# Patient Record
Sex: MALE | Race: OTHER | HISPANIC OR LATINO | URBAN - METROPOLITAN AREA
[De-identification: names, ages, dates, MRNs, and addresses within clinical notes are randomized per-mention and may not be internally consistent; named-entity substitution may affect disease eponyms.]

---

## 2020-05-11 ENCOUNTER — HOSPITAL ENCOUNTER (INPATIENT)
Facility: HOSPITAL | Age: 45
LOS: 2 days | Discharge: HOME | DRG: 378 | End: 2020-05-14
Attending: FAMILY MEDICINE | Admitting: INTERNAL MEDICINE

## 2020-05-11 DIAGNOSIS — D62 ACUTE BLOOD LOSS ANEMIA: ICD-10-CM

## 2020-05-11 DIAGNOSIS — K92.2 GI BLEED: ICD-10-CM

## 2020-05-11 DIAGNOSIS — K25.4 GASTROINTESTINAL HEMORRHAGE ASSOCIATED WITH GASTRIC ULCER: Primary | ICD-10-CM

## 2020-05-11 DIAGNOSIS — R55 SYNCOPE, UNSPECIFIED SYNCOPE TYPE: ICD-10-CM

## 2020-05-11 DIAGNOSIS — K92.2 UPPER GI BLEED: ICD-10-CM

## 2020-05-11 PROBLEM — D64.9 SYMPTOMATIC ANEMIA: Status: ACTIVE | Noted: 2020-05-11

## 2020-05-11 LAB
ABO + RH BLD: NORMAL
ALBUMIN SERPL BCP-MCNC: 2.9 G/DL (ref 3.5–5.2)
ALP SERPL-CCNC: 46 U/L (ref 55–135)
ALT SERPL W/O P-5'-P-CCNC: 15 U/L (ref 10–44)
ANION GAP SERPL CALC-SCNC: 9 MMOL/L (ref 8–16)
AST SERPL-CCNC: 19 U/L (ref 10–40)
BASOPHILS # BLD AUTO: 0.03 K/UL (ref 0–0.2)
BASOPHILS NFR BLD: 0.3 % (ref 0–1.9)
BILIRUB SERPL-MCNC: 0.1 MG/DL (ref 0.1–1)
BILIRUB UR QL STRIP: NEGATIVE
BLD GP AB SCN CELLS X3 SERPL QL: NORMAL
BUN SERPL-MCNC: 30 MG/DL (ref 6–20)
CALCIUM SERPL-MCNC: 8.4 MG/DL (ref 8.7–10.5)
CHLORIDE SERPL-SCNC: 106 MMOL/L (ref 95–110)
CLARITY UR: CLEAR
CO2 SERPL-SCNC: 26 MMOL/L (ref 23–29)
COLOR UR: YELLOW
CREAT SERPL-MCNC: 0.9 MG/DL (ref 0.5–1.4)
DIFFERENTIAL METHOD: ABNORMAL
EOSINOPHIL # BLD AUTO: 0 K/UL (ref 0–0.5)
EOSINOPHIL NFR BLD: 0.3 % (ref 0–8)
ERYTHROCYTE [DISTWIDTH] IN BLOOD BY AUTOMATED COUNT: 12 % (ref 11.5–14.5)
EST. GFR  (AFRICAN AMERICAN): >60 ML/MIN/1.73 M^2
EST. GFR  (NON AFRICAN AMERICAN): >60 ML/MIN/1.73 M^2
GLUCOSE SERPL-MCNC: 107 MG/DL (ref 70–110)
GLUCOSE UR QL STRIP: NEGATIVE
HCT VFR BLD AUTO: 24.1 % (ref 40–54)
HGB BLD-MCNC: 7.9 G/DL (ref 14–18)
HGB UR QL STRIP: NEGATIVE
HIV 1+2 AB+HIV1 P24 AG SERPL QL IA: NEGATIVE
IMM GRANULOCYTES # BLD AUTO: 0.07 K/UL (ref 0–0.04)
IMM GRANULOCYTES NFR BLD AUTO: 0.7 % (ref 0–0.5)
INR PPP: 1.1 (ref 0.8–1.2)
KETONES UR QL STRIP: ABNORMAL
LACTATE SERPL-SCNC: 2.2 MMOL/L (ref 0.5–2.2)
LEUKOCYTE ESTERASE UR QL STRIP: NEGATIVE
LYMPHOCYTES # BLD AUTO: 1.2 K/UL (ref 1–4.8)
LYMPHOCYTES NFR BLD: 12.5 % (ref 18–48)
MCH RBC QN AUTO: 29.5 PG (ref 27–31)
MCHC RBC AUTO-ENTMCNC: 32.8 G/DL (ref 32–36)
MCV RBC AUTO: 90 FL (ref 82–98)
MONOCYTES # BLD AUTO: 0.8 K/UL (ref 0.3–1)
MONOCYTES NFR BLD: 8.2 % (ref 4–15)
NEUTROPHILS # BLD AUTO: 7.4 K/UL (ref 1.8–7.7)
NEUTROPHILS NFR BLD: 78 % (ref 38–73)
NITRITE UR QL STRIP: NEGATIVE
NRBC BLD-RTO: 0 /100 WBC
PH UR STRIP: 7 [PH] (ref 5–8)
PLATELET # BLD AUTO: 226 K/UL (ref 150–350)
PMV BLD AUTO: 10.5 FL (ref 9.2–12.9)
POTASSIUM SERPL-SCNC: 3.8 MMOL/L (ref 3.5–5.1)
PROT SERPL-MCNC: 5.4 G/DL (ref 6–8.4)
PROT UR QL STRIP: NEGATIVE
PROTHROMBIN TIME: 11.3 SEC (ref 9–12.5)
RBC # BLD AUTO: 2.68 M/UL (ref 4.6–6.2)
SODIUM SERPL-SCNC: 141 MMOL/L (ref 136–145)
SP GR UR STRIP: 1.02 (ref 1–1.03)
URN SPEC COLLECT METH UR: ABNORMAL
UROBILINOGEN UR STRIP-ACNC: NEGATIVE EU/DL
WBC # BLD AUTO: 9.44 K/UL (ref 3.9–12.7)

## 2020-05-11 PROCEDURE — 83605 ASSAY OF LACTIC ACID: CPT

## 2020-05-11 PROCEDURE — 87040 BLOOD CULTURE FOR BACTERIA: CPT

## 2020-05-11 PROCEDURE — C9113 INJ PANTOPRAZOLE SODIUM, VIA: HCPCS | Performed by: FAMILY MEDICINE

## 2020-05-11 PROCEDURE — 36430 TRANSFUSION BLD/BLD COMPNT: CPT

## 2020-05-11 PROCEDURE — 85025 COMPLETE CBC W/AUTO DIFF WBC: CPT

## 2020-05-11 PROCEDURE — 36415 COLL VENOUS BLD VENIPUNCTURE: CPT

## 2020-05-11 PROCEDURE — 81003 URINALYSIS AUTO W/O SCOPE: CPT

## 2020-05-11 PROCEDURE — U0002 COVID-19 LAB TEST NON-CDC: HCPCS

## 2020-05-11 PROCEDURE — 96374 THER/PROPH/DIAG INJ IV PUSH: CPT

## 2020-05-11 PROCEDURE — 86703 HIV-1/HIV-2 1 RESULT ANTBDY: CPT

## 2020-05-11 PROCEDURE — 63600175 PHARM REV CODE 636 W HCPCS: Performed by: FAMILY MEDICINE

## 2020-05-11 PROCEDURE — 93010 EKG 12-LEAD: ICD-10-PCS | Mod: ,,, | Performed by: INTERNAL MEDICINE

## 2020-05-11 PROCEDURE — 80053 COMPREHEN METABOLIC PANEL: CPT

## 2020-05-11 PROCEDURE — 96361 HYDRATE IV INFUSION ADD-ON: CPT

## 2020-05-11 PROCEDURE — 99291 CRITICAL CARE FIRST HOUR: CPT

## 2020-05-11 PROCEDURE — 93005 ELECTROCARDIOGRAM TRACING: CPT

## 2020-05-11 PROCEDURE — 85610 PROTHROMBIN TIME: CPT

## 2020-05-11 PROCEDURE — 25000003 PHARM REV CODE 250: Performed by: FAMILY MEDICINE

## 2020-05-11 PROCEDURE — 86920 COMPATIBILITY TEST SPIN: CPT

## 2020-05-11 PROCEDURE — 93010 ELECTROCARDIOGRAM REPORT: CPT | Mod: ,,, | Performed by: INTERNAL MEDICINE

## 2020-05-11 PROCEDURE — P9016 RBC LEUKOCYTES REDUCED: HCPCS

## 2020-05-11 PROCEDURE — 86901 BLOOD TYPING SEROLOGIC RH(D): CPT

## 2020-05-11 RX ORDER — ACETAMINOPHEN 325 MG/1
650 TABLET ORAL EVERY 6 HOURS PRN
Status: DISCONTINUED | OUTPATIENT
Start: 2020-05-12 | End: 2020-05-14 | Stop reason: HOSPADM

## 2020-05-11 RX ORDER — ONDANSETRON 8 MG/1
8 TABLET, ORALLY DISINTEGRATING ORAL EVERY 8 HOURS PRN
Status: DISCONTINUED | OUTPATIENT
Start: 2020-05-12 | End: 2020-05-14 | Stop reason: HOSPADM

## 2020-05-11 RX ORDER — SODIUM CHLORIDE 9 MG/ML
INJECTION, SOLUTION INTRAVENOUS CONTINUOUS
Status: DISCONTINUED | OUTPATIENT
Start: 2020-05-12 | End: 2020-05-12

## 2020-05-11 RX ORDER — PANTOPRAZOLE SODIUM 40 MG/10ML
40 INJECTION, POWDER, LYOPHILIZED, FOR SOLUTION INTRAVENOUS 2 TIMES DAILY
Status: DISCONTINUED | OUTPATIENT
Start: 2020-05-11 | End: 2020-05-11

## 2020-05-11 RX ORDER — HYDROCODONE BITARTRATE AND ACETAMINOPHEN 5; 325 MG/1; MG/1
1 TABLET ORAL EVERY 6 HOURS PRN
Status: DISCONTINUED | OUTPATIENT
Start: 2020-05-12 | End: 2020-05-14 | Stop reason: HOSPADM

## 2020-05-11 RX ORDER — HYDROCODONE BITARTRATE AND ACETAMINOPHEN 500; 5 MG/1; MG/1
TABLET ORAL
Status: DISCONTINUED | OUTPATIENT
Start: 2020-05-11 | End: 2020-05-14 | Stop reason: HOSPADM

## 2020-05-11 RX ADMIN — SODIUM CHLORIDE 1000 ML: 0.9 INJECTION, SOLUTION INTRAVENOUS at 09:05

## 2020-05-11 RX ADMIN — SODIUM CHLORIDE: 0.9 INJECTION, SOLUTION INTRAVENOUS at 11:05

## 2020-05-11 RX ADMIN — DEXTROSE 8 MG/HR: 50 INJECTION, SOLUTION INTRAVENOUS at 11:05

## 2020-05-12 ENCOUNTER — ANESTHESIA (OUTPATIENT)
Dept: ENDOSCOPY | Facility: HOSPITAL | Age: 45
DRG: 378 | End: 2020-05-12

## 2020-05-12 ENCOUNTER — ANESTHESIA EVENT (OUTPATIENT)
Dept: ENDOSCOPY | Facility: HOSPITAL | Age: 45
DRG: 378 | End: 2020-05-12

## 2020-05-12 PROBLEM — D62 ACUTE BLOOD LOSS ANEMIA: Status: ACTIVE | Noted: 2020-05-11

## 2020-05-12 PROBLEM — K92.2 GI BLEED: Status: ACTIVE | Noted: 2020-05-12

## 2020-05-12 LAB
ANION GAP SERPL CALC-SCNC: 8 MMOL/L (ref 8–16)
ANION GAP SERPL CALC-SCNC: 9 MMOL/L (ref 8–16)
BASOPHILS # BLD AUTO: 0.01 K/UL (ref 0–0.2)
BASOPHILS # BLD AUTO: 0.02 K/UL (ref 0–0.2)
BASOPHILS # BLD AUTO: 0.03 K/UL (ref 0–0.2)
BASOPHILS # BLD AUTO: 0.03 K/UL (ref 0–0.2)
BASOPHILS NFR BLD: 0.1 % (ref 0–1.9)
BASOPHILS NFR BLD: 0.3 % (ref 0–1.9)
BASOPHILS NFR BLD: 0.3 % (ref 0–1.9)
BASOPHILS NFR BLD: 0.4 % (ref 0–1.9)
BLD PROD TYP BPU: NORMAL
BLD PROD TYP BPU: NORMAL
BLOOD UNIT EXPIRATION DATE: NORMAL
BLOOD UNIT EXPIRATION DATE: NORMAL
BLOOD UNIT TYPE CODE: 5100
BLOOD UNIT TYPE CODE: 5100
BLOOD UNIT TYPE: NORMAL
BLOOD UNIT TYPE: NORMAL
BUN SERPL-MCNC: 19 MG/DL (ref 6–20)
BUN SERPL-MCNC: 31 MG/DL (ref 6–20)
CALCIUM SERPL-MCNC: 7.7 MG/DL (ref 8.7–10.5)
CALCIUM SERPL-MCNC: 8 MG/DL (ref 8.7–10.5)
CHLORIDE SERPL-SCNC: 108 MMOL/L (ref 95–110)
CHLORIDE SERPL-SCNC: 109 MMOL/L (ref 95–110)
CO2 SERPL-SCNC: 23 MMOL/L (ref 23–29)
CO2 SERPL-SCNC: 24 MMOL/L (ref 23–29)
CODING SYSTEM: NORMAL
CODING SYSTEM: NORMAL
CREAT SERPL-MCNC: 0.8 MG/DL (ref 0.5–1.4)
CREAT SERPL-MCNC: 0.8 MG/DL (ref 0.5–1.4)
DACRYOCYTES BLD QL SMEAR: ABNORMAL
DIFFERENTIAL METHOD: ABNORMAL
DISPENSE STATUS: NORMAL
DISPENSE STATUS: NORMAL
EOSINOPHIL # BLD AUTO: 0 K/UL (ref 0–0.5)
EOSINOPHIL # BLD AUTO: 0.1 K/UL (ref 0–0.5)
EOSINOPHIL NFR BLD: 0.3 % (ref 0–8)
EOSINOPHIL NFR BLD: 0.3 % (ref 0–8)
EOSINOPHIL NFR BLD: 0.4 % (ref 0–8)
EOSINOPHIL NFR BLD: 0.7 % (ref 0–8)
ERYTHROCYTE [DISTWIDTH] IN BLOOD BY AUTOMATED COUNT: 12.6 % (ref 11.5–14.5)
ERYTHROCYTE [DISTWIDTH] IN BLOOD BY AUTOMATED COUNT: 12.6 % (ref 11.5–14.5)
ERYTHROCYTE [DISTWIDTH] IN BLOOD BY AUTOMATED COUNT: 12.8 % (ref 11.5–14.5)
ERYTHROCYTE [DISTWIDTH] IN BLOOD BY AUTOMATED COUNT: 12.8 % (ref 11.5–14.5)
EST. GFR  (AFRICAN AMERICAN): >60 ML/MIN/1.73 M^2
EST. GFR  (AFRICAN AMERICAN): >60 ML/MIN/1.73 M^2
EST. GFR  (NON AFRICAN AMERICAN): >60 ML/MIN/1.73 M^2
EST. GFR  (NON AFRICAN AMERICAN): >60 ML/MIN/1.73 M^2
GLUCOSE SERPL-MCNC: 112 MG/DL (ref 70–110)
GLUCOSE SERPL-MCNC: 94 MG/DL (ref 70–110)
HCT VFR BLD AUTO: 25.5 % (ref 40–54)
HCT VFR BLD AUTO: 26.7 % (ref 40–54)
HCT VFR BLD AUTO: 27.9 % (ref 40–54)
HCT VFR BLD AUTO: 28 % (ref 40–54)
HCT VFR BLD AUTO: 28.3 % (ref 40–54)
HGB BLD-MCNC: 8.5 G/DL (ref 14–18)
HGB BLD-MCNC: 8.7 G/DL (ref 14–18)
HGB BLD-MCNC: 9.1 G/DL (ref 14–18)
HGB BLD-MCNC: 9.2 G/DL (ref 14–18)
HGB BLD-MCNC: 9.3 G/DL (ref 14–18)
IMM GRANULOCYTES # BLD AUTO: 0.04 K/UL (ref 0–0.04)
IMM GRANULOCYTES NFR BLD AUTO: 0.5 % (ref 0–0.5)
IMM GRANULOCYTES NFR BLD AUTO: 0.6 % (ref 0–0.5)
LYMPHOCYTES # BLD AUTO: 1.8 K/UL (ref 1–4.8)
LYMPHOCYTES # BLD AUTO: 1.9 K/UL (ref 1–4.8)
LYMPHOCYTES # BLD AUTO: 2 K/UL (ref 1–4.8)
LYMPHOCYTES # BLD AUTO: 2.2 K/UL (ref 1–4.8)
LYMPHOCYTES NFR BLD: 22.2 % (ref 18–48)
LYMPHOCYTES NFR BLD: 25.3 % (ref 18–48)
LYMPHOCYTES NFR BLD: 26.4 % (ref 18–48)
LYMPHOCYTES NFR BLD: 28.4 % (ref 18–48)
MAGNESIUM SERPL-MCNC: 1.9 MG/DL (ref 1.6–2.6)
MCH RBC QN AUTO: 28.8 PG (ref 27–31)
MCH RBC QN AUTO: 29 PG (ref 27–31)
MCH RBC QN AUTO: 29.1 PG (ref 27–31)
MCH RBC QN AUTO: 29.1 PG (ref 27–31)
MCHC RBC AUTO-ENTMCNC: 32.6 G/DL (ref 32–36)
MCHC RBC AUTO-ENTMCNC: 32.6 G/DL (ref 32–36)
MCHC RBC AUTO-ENTMCNC: 32.9 G/DL (ref 32–36)
MCHC RBC AUTO-ENTMCNC: 32.9 G/DL (ref 32–36)
MCV RBC AUTO: 88 FL (ref 82–98)
MCV RBC AUTO: 88 FL (ref 82–98)
MCV RBC AUTO: 89 FL (ref 82–98)
MCV RBC AUTO: 89 FL (ref 82–98)
MONOCYTES # BLD AUTO: 0.5 K/UL (ref 0.3–1)
MONOCYTES # BLD AUTO: 0.6 K/UL (ref 0.3–1)
MONOCYTES # BLD AUTO: 0.6 K/UL (ref 0.3–1)
MONOCYTES # BLD AUTO: 0.7 K/UL (ref 0.3–1)
MONOCYTES NFR BLD: 6.3 % (ref 4–15)
MONOCYTES NFR BLD: 7.5 % (ref 4–15)
MONOCYTES NFR BLD: 7.8 % (ref 4–15)
MONOCYTES NFR BLD: 8 % (ref 4–15)
NEUTROPHILS # BLD AUTO: 4.8 K/UL (ref 1.8–7.7)
NEUTROPHILS # BLD AUTO: 4.8 K/UL (ref 1.8–7.7)
NEUTROPHILS # BLD AUTO: 5.1 K/UL (ref 1.8–7.7)
NEUTROPHILS # BLD AUTO: 6 K/UL (ref 1.8–7.7)
NEUTROPHILS NFR BLD: 64 % (ref 38–73)
NEUTROPHILS NFR BLD: 65 % (ref 38–73)
NEUTROPHILS NFR BLD: 65.7 % (ref 38–73)
NEUTROPHILS NFR BLD: 68.5 % (ref 38–73)
NRBC BLD-RTO: 0 /100 WBC
NUM UNITS TRANS PACKED RBC: NORMAL
NUM UNITS TRANS PACKED RBC: NORMAL
OVALOCYTES BLD QL SMEAR: ABNORMAL
PHOSPHATE SERPL-MCNC: 2.6 MG/DL (ref 2.7–4.5)
PLATELET # BLD AUTO: 184 K/UL (ref 150–350)
PLATELET # BLD AUTO: 184 K/UL (ref 150–350)
PLATELET # BLD AUTO: 191 K/UL (ref 150–350)
PLATELET # BLD AUTO: 191 K/UL (ref 150–350)
PMV BLD AUTO: 10.1 FL (ref 9.2–12.9)
PMV BLD AUTO: 10.5 FL (ref 9.2–12.9)
PMV BLD AUTO: 10.7 FL (ref 9.2–12.9)
PMV BLD AUTO: 11 FL (ref 9.2–12.9)
POIKILOCYTOSIS BLD QL SMEAR: SLIGHT
POTASSIUM SERPL-SCNC: 3.6 MMOL/L (ref 3.5–5.1)
POTASSIUM SERPL-SCNC: 3.9 MMOL/L (ref 3.5–5.1)
RBC # BLD AUTO: 3.02 M/UL (ref 4.6–6.2)
RBC # BLD AUTO: 3.13 M/UL (ref 4.6–6.2)
RBC # BLD AUTO: 3.16 M/UL (ref 4.6–6.2)
RBC # BLD AUTO: 3.21 M/UL (ref 4.6–6.2)
SARS-COV-2 RDRP RESP QL NAA+PROBE: NEGATIVE
SODIUM SERPL-SCNC: 140 MMOL/L (ref 136–145)
SODIUM SERPL-SCNC: 141 MMOL/L (ref 136–145)
WBC # BLD AUTO: 7.24 K/UL (ref 3.9–12.7)
WBC # BLD AUTO: 7.35 K/UL (ref 3.9–12.7)
WBC # BLD AUTO: 7.89 K/UL (ref 3.9–12.7)
WBC # BLD AUTO: 8.81 K/UL (ref 3.9–12.7)

## 2020-05-12 PROCEDURE — P9016 RBC LEUKOCYTES REDUCED: HCPCS

## 2020-05-12 PROCEDURE — 88305 TISSUE EXAM BY PATHOLOGIST: CPT | Mod: 26,,, | Performed by: PATHOLOGY

## 2020-05-12 PROCEDURE — 43239 EGD BIOPSY SINGLE/MULTIPLE: CPT

## 2020-05-12 PROCEDURE — 99222 PR INITIAL HOSPITAL CARE,LEVL II: ICD-10-PCS | Mod: ,,, | Performed by: SURGERY

## 2020-05-12 PROCEDURE — 88305 TISSUE EXAM BY PATHOLOGIST: CPT | Performed by: PATHOLOGY

## 2020-05-12 PROCEDURE — 43255 EGD CONTROL BLEEDING ANY: CPT | Mod: ,,, | Performed by: INTERNAL MEDICINE

## 2020-05-12 PROCEDURE — 27201012 HC FORCEPS, HOT/COLD, DISP: Performed by: INTERNAL MEDICINE

## 2020-05-12 PROCEDURE — 99222 1ST HOSP IP/OBS MODERATE 55: CPT | Mod: ,,, | Performed by: SURGERY

## 2020-05-12 PROCEDURE — 63600175 PHARM REV CODE 636 W HCPCS: Performed by: NURSE ANESTHETIST, CERTIFIED REGISTERED

## 2020-05-12 PROCEDURE — 11000001 HC ACUTE MED/SURG PRIVATE ROOM

## 2020-05-12 PROCEDURE — 85014 HEMATOCRIT: CPT

## 2020-05-12 PROCEDURE — 27201038 HC PROBE, BI-POLAR: Performed by: INTERNAL MEDICINE

## 2020-05-12 PROCEDURE — 85018 HEMOGLOBIN: CPT

## 2020-05-12 PROCEDURE — 43255 PR EGD, FLEX, W/CTRL BLEED, ANY METHOD: ICD-10-PCS | Mod: ,,, | Performed by: INTERNAL MEDICINE

## 2020-05-12 PROCEDURE — C9113 INJ PANTOPRAZOLE SODIUM, VIA: HCPCS | Performed by: FAMILY MEDICINE

## 2020-05-12 PROCEDURE — 80048 BASIC METABOLIC PNL TOTAL CA: CPT

## 2020-05-12 PROCEDURE — 88305 TISSUE EXAM BY PATHOLOGIST: ICD-10-PCS | Mod: 26,,, | Performed by: PATHOLOGY

## 2020-05-12 PROCEDURE — 37000009 HC ANESTHESIA EA ADD 15 MINS: Performed by: INTERNAL MEDICINE

## 2020-05-12 PROCEDURE — 25000003 PHARM REV CODE 250: Performed by: INTERNAL MEDICINE

## 2020-05-12 PROCEDURE — 43255 EGD CONTROL BLEEDING ANY: CPT | Performed by: INTERNAL MEDICINE

## 2020-05-12 PROCEDURE — 80048 BASIC METABOLIC PNL TOTAL CA: CPT | Mod: 91

## 2020-05-12 PROCEDURE — 85025 COMPLETE CBC W/AUTO DIFF WBC: CPT

## 2020-05-12 PROCEDURE — 99223 1ST HOSP IP/OBS HIGH 75: CPT | Mod: 25,,, | Performed by: INTERNAL MEDICINE

## 2020-05-12 PROCEDURE — 84100 ASSAY OF PHOSPHORUS: CPT

## 2020-05-12 PROCEDURE — 21400001 HC TELEMETRY ROOM

## 2020-05-12 PROCEDURE — 99223 PR INITIAL HOSPITAL CARE,LEVL III: ICD-10-PCS | Mod: 25,,, | Performed by: INTERNAL MEDICINE

## 2020-05-12 PROCEDURE — 37000008 HC ANESTHESIA 1ST 15 MINUTES: Performed by: INTERNAL MEDICINE

## 2020-05-12 PROCEDURE — 83735 ASSAY OF MAGNESIUM: CPT

## 2020-05-12 PROCEDURE — 25000003 PHARM REV CODE 250: Performed by: FAMILY MEDICINE

## 2020-05-12 PROCEDURE — 63600175 PHARM REV CODE 636 W HCPCS: Performed by: FAMILY MEDICINE

## 2020-05-12 PROCEDURE — 36415 COLL VENOUS BLD VENIPUNCTURE: CPT

## 2020-05-12 RX ORDER — PROPOFOL 10 MG/ML
VIAL (ML) INTRAVENOUS
Status: DISCONTINUED | OUTPATIENT
Start: 2020-05-12 | End: 2020-05-12

## 2020-05-12 RX ORDER — SODIUM CHLORIDE 9 MG/ML
INJECTION, SOLUTION INTRAVENOUS CONTINUOUS
Status: DISCONTINUED | OUTPATIENT
Start: 2020-05-12 | End: 2020-05-14

## 2020-05-12 RX ORDER — SUCRALFATE 1 G/10ML
1 SUSPENSION ORAL EVERY 6 HOURS
Status: DISCONTINUED | OUTPATIENT
Start: 2020-05-12 | End: 2020-05-14 | Stop reason: HOSPADM

## 2020-05-12 RX ORDER — SODIUM CHLORIDE, SODIUM LACTATE, POTASSIUM CHLORIDE, CALCIUM CHLORIDE 600; 310; 30; 20 MG/100ML; MG/100ML; MG/100ML; MG/100ML
INJECTION, SOLUTION INTRAVENOUS CONTINUOUS PRN
Status: DISCONTINUED | OUTPATIENT
Start: 2020-05-12 | End: 2020-05-12

## 2020-05-12 RX ADMIN — SODIUM CHLORIDE: 0.9 INJECTION, SOLUTION INTRAVENOUS at 12:05

## 2020-05-12 RX ADMIN — PROPOFOL 100 MG: 10 INJECTION, EMULSION INTRAVENOUS at 10:05

## 2020-05-12 RX ADMIN — SODIUM CHLORIDE, SODIUM LACTATE, POTASSIUM CHLORIDE, AND CALCIUM CHLORIDE: 600; 310; 30; 20 INJECTION, SOLUTION INTRAVENOUS at 10:05

## 2020-05-12 RX ADMIN — PROPOFOL 50 MG: 10 INJECTION, EMULSION INTRAVENOUS at 10:05

## 2020-05-12 RX ADMIN — SUCRALFATE 1 G: 1 SUSPENSION ORAL at 06:05

## 2020-05-12 RX ADMIN — PROPOFOL 50 MG: 10 INJECTION, EMULSION INTRAVENOUS at 11:05

## 2020-05-12 RX ADMIN — DEXTROSE 8 MG/HR: 50 INJECTION, SOLUTION INTRAVENOUS at 11:05

## 2020-05-12 RX ADMIN — SUCRALFATE 1 G: 1 SUSPENSION ORAL at 12:05

## 2020-05-12 RX ADMIN — DEXTROSE 8 MG/HR: 50 INJECTION, SOLUTION INTRAVENOUS at 12:05

## 2020-05-12 RX ADMIN — SUCRALFATE 1 G: 1 SUSPENSION ORAL at 11:05

## 2020-05-12 RX ADMIN — DEXTROSE 8 MG/HR: 50 INJECTION, SOLUTION INTRAVENOUS at 06:05

## 2020-05-12 NOTE — TRANSFER OF CARE
"Anesthesia Transfer of Care Note    Patient: Lorne Cartwright    Procedure(s) Performed: Procedure(s) (LRB):  EGD (ESOPHAGOGASTRODUODENOSCOPY) (N/A)    Patient location: GI    Anesthesia Type: MAC    Transport from OR: Transported from OR on room air with adequate spontaneous ventilation    Post pain: adequate analgesia    Post assessment: no apparent anesthetic complications    Post vital signs: stable    Level of consciousness: awake, alert and oriented    Nausea/Vomiting: no nausea/vomiting    Complications: none    Transfer of care protocol was followed      Last vitals:   Visit Vitals  BP (!) 153/89 (BP Location: Right arm, Patient Position: Lying)   Pulse 81   Temp 37.3 °C (99.2 °F) (Oral)   Resp 18   Ht 5' 7" (1.702 m)   Wt 81.6 kg (179 lb 14.3 oz)   SpO2 97%   BMI 28.18 kg/m²     "

## 2020-05-12 NOTE — PLAN OF CARE
Sudden onset nausea and vomiting upon entering recovery , elevated head of stretcher to maintain pt safety, oral care provided, anesthesia aware, gave medication per IV.

## 2020-05-12 NOTE — ED NOTES
Miriam used in presenting information about blood transfusion.  Representative Isaiah West ID #[id number], on line and will remain online for 15 minutes to ask questions to patient.

## 2020-05-12 NOTE — INTERVAL H&P NOTE
The patient has been examined and the H&P has been reviewed:    I concur with the findings and no changes have occurred since H&P was written.    Anesthesia/Surgery risks, benefits and alternative options discussed and understood by patient/family.          Active Hospital Problems    Diagnosis  POA    *Upper GI bleed [K92.2]  Yes    Syncope [R55]  Yes    Acute blood loss anemia [D62]  Yes      Resolved Hospital Problems   No resolved problems to display.

## 2020-05-12 NOTE — CONSULTS
[facility] -   Gastroenterology  Consult Note    Patient Name: Lorne Cartwright  MRN: [id number]  Admission Date: 5/11/2020  Hospital Length of Stay: 0 days  Code Status: Full Code   Attending Provider: Jack Hubbard MD   Consulting Provider: Sydnie Whalen PA-C  Primary Care Physician: No primary care provider on file.  Principal Problem:Upper GI bleed    Inpatient consult to Gastroenterology  Consult performed by: Sydnie Whalen PA-C  Consult ordered by: HARLEY Multani  Reason for consult: upper GI bleed        Subjective:     HPI:  Patient is a 43yo male who presented to the ER via ambulance after an episode of syncope. Patient states that 2 weeks ago he began feeling some upper abdominal discomfort and nausea. Has had decreased appetite. He has noticed over the past few days that his stools had turned black. He mentions this has happened once before but he ignored it. He reports that he actually had an additional syncopal episode this past Saturday. Bowels have been moving normally with no constipation or diarrhea. Last BM was while he was passed out yesterday. Has not had a stool while in the hospital. Denies current nausea or abdominal pain. Was started on PPI BID yesterday and made NPO. Hgb on admit was 7.9. Transfused 2 units and notes much improvement. He has no current complaints. Denies NSAID use.    Jennifer was used for HPI.  number [id number].    History reviewed. No pertinent past medical history.    History reviewed. No pertinent surgical history.    Review of patient's allergies indicates:  No Known Allergies  Family History     None        Tobacco Use    Smoking status: Current Some Day Smoker   Substance and Sexual Activity    Alcohol use: Not Currently    Drug use: Never    Sexual activity: Not on file     Review of Systems   Constitutional: Negative for appetite change, chills, diaphoresis and fatigue.   HENT: Negative for trouble swallowing.     Respiratory: Negative for chest tightness and shortness of breath.    Cardiovascular: Negative for chest pain.   Gastrointestinal: Positive for blood in stool (Although no stool since admission). Negative for abdominal pain, constipation, diarrhea, nausea and vomiting.   Genitourinary: Negative for dysuria.   Musculoskeletal: Negative for back pain.   Neurological: Negative for dizziness, weakness and light-headedness.   Psychiatric/Behavioral: Negative for agitation and dysphoric mood. The patient is not nervous/anxious.      Objective:     Vital Signs (Most Recent):  Temp: 98.4 °F (36.9 °C) (05/12/20 0734)  Pulse: 83 (05/12/20 0734)  Resp: 18 (05/12/20 0734)  BP: 121/67 (05/12/20 0734)  SpO2: 98 % (05/12/20 0734) Vital Signs (24h Range):  Temp:  [98.2 °F (36.8 °C)-99.5 °F (37.5 °C)] 98.4 °F (36.9 °C)  Pulse:  [] 83  Resp:  [12-24] 18  SpO2:  [95 %-100 %] 98 %  BP: ()/(50-88) 121/67     Weight: 81.6 kg (179 lb 14.3 oz) (05/12/20 0331)  Body mass index is 28.18 kg/m².      Intake/Output Summary (Last 24 hours) at 5/12/2020 0917  Last data filed at 5/12/2020 0440  Gross per 24 hour   Intake 1755.42 ml   Output --   Net 1755.42 ml       Lines/Drains/Airways     Peripheral Intravenous Line                 Peripheral IV - Single Lumen 05/11/20 18 G Left Antecubital 1 day         Peripheral IV - Single Lumen 05/11/20 2142 20 G Right Antecubital less than 1 day                Physical Exam   Constitutional: He is oriented to person, place, and time. He appears well-developed and well-nourished. No distress.   HENT:   Head: Normocephalic and atraumatic.   Eyes: EOM are normal. Right eye exhibits no discharge. Left eye exhibits no discharge. No scleral icterus.   Neck: Normal range of motion.   Cardiovascular: Normal rate, regular rhythm and normal heart sounds.   No murmur heard.  Pulmonary/Chest: Effort normal and breath sounds normal. No stridor. No respiratory distress. He has no wheezes.   Abdominal:  Soft. Bowel sounds are normal. He exhibits no distension. There is no tenderness. There is no guarding.   Musculoskeletal: Normal range of motion.   Neurological: He is alert and oriented to person, place, and time.   Skin: Skin is warm and dry. No rash noted. He is not diaphoretic. No erythema. No pallor.   Psychiatric: He has a normal mood and affect. His behavior is normal. Judgment and thought content normal.       Significant Labs:  CBC:   Recent Labs   Lab 05/11/20 2150 05/12/20  0611 05/12/20  0803   WBC 9.44 7.24 7.35   HGB 7.9* 9.1* 9.3*   HCT 24.1* 27.9* 28.3*    191 184     CMP:   Recent Labs   Lab 05/11/20 2150 05/12/20  0611    94   CALCIUM 8.4* 7.7*   ALBUMIN 2.9*  --    PROT 5.4*  --     140   K 3.8 3.9   CO2 26 24    108   BUN 30* 31*   CREATININE 0.9 0.8   ALKPHOS 46*  --    ALT 15  --    AST 19  --    BILITOT 0.1  --      Coagulation:   Recent Labs   Lab 05/11/20 2150   INR 1.1       Significant Imaging:  Imaging results within the past 24 hours have been reviewed.    Assessment/Plan:     * Upper GI bleed  -Continue to monitor vitals, Hgb, Hct.  -Transfuse as needed.  -NPO status  -Will complete EGD today.  -Continue PPI BID.  -IV fluids.          Thank you for your consult. I will follow-up with patient. Please contact us if you have any additional questions.    Sydnie Whalen PA-C  Gastroenterology  [facility]

## 2020-05-12 NOTE — HPI
Patient is a 45yo male who presented to the ER via ambulance after an episode of syncope. Patient states that 2 weeks ago he began feeling some upper abdominal discomfort and nausea. Has had decreased appetite. He has noticed over the past few days that his stools had turned black. He mentions this has happened once before but he ignored it. He reports that he actually had an additional syncopal episode this past Saturday. Bowels have been moving normally with no constipation or diarrhea. Last BM was while he was passed out yesterday. Has not had a stool while in the hospital. Denies current nausea or abdominal pain. Was started on PPI BID yesterday and made NPO. Hgb on admit was 7.9. Transfused 2 units and notes much improvement. He has no current complaints. Denies NSAID use.    Jennifer was used for HPI.  number [id number].

## 2020-05-12 NOTE — ASSESSMENT & PLAN NOTE
-Most likely due to acute volume loss associated with GI bleeding.   -Monitor.   5/12- S/P 1 unit of P-RBC transfusion . Vitals are stable

## 2020-05-12 NOTE — ASSESSMENT & PLAN NOTE
-Possibly due to nadir seltzer abuse.   -IV Protonix.   -Monitor hemoglobin and hematocrit.   -Transfuse as needed.   -GI consult.   5/12- S/P 1 unit of P-RBC transfusion . EGD today . Continue PPI infusion

## 2020-05-12 NOTE — ANESTHESIA POSTPROCEDURE EVALUATION
Anesthesia Post Evaluation    Patient: Lorne Cartwright    Procedure(s) Performed: Procedure(s) (LRB):  EGD (ESOPHAGOGASTRODUODENOSCOPY) (N/A)    Final Anesthesia Type: MAC    Patient location during evaluation: GI PACU  Patient participation: Yes- Able to Participate  Level of consciousness: awake and alert and oriented  Post-procedure vital signs: reviewed and stable  Pain management: adequate  Airway patency: patent    PONV status at discharge: No PONV  Anesthetic complications: no      Cardiovascular status: blood pressure returned to baseline, stable and hemodynamically stable  Respiratory status: unassisted, room air and spontaneous ventilation  Hydration status: euvolemic  Follow-up not needed.          Vitals Value Taken Time   /89 5/12/2020  1:06 PM   Temp 37.3 °C (99.2 °F) 5/12/2020  1:06 PM   Pulse 81 5/12/2020  1:15 PM   Resp 18 5/12/2020  1:06 PM   SpO2 97 % 5/12/2020  1:06 PM         Event Time     Out of Recovery 05/12/2020 12:16:41          Pain/Kiet Score: Kiet Score: 10 (5/12/2020 11:50 AM)

## 2020-05-12 NOTE — PROGRESS NOTES
[facility] Hospital Medicine  Progress Note    Patient Name: Lorne Cartwright  MRN: [id number]  Patient Class: IP- Inpatient   Admission Date: 5/11/2020  Length of Stay: 0 days  Attending Physician: Jack Hubbard MD  Primary Care Provider: No primary care provider on file.        Subjective:     Principal Problem:Upper GI bleed        HPI:  Lorne Cartwright is a 44 year old male with no past medical history who presented to the ED with reports of a 1 week history of epigastric abdominal pain, nausea, vomiting and black stools. The patient states that his epigastric abdominal pain is worse after eating and he feels that his food is not going down. He reports only intermittent emesis which is typically clear, but 2 days prior to presentation, he noted a small amount of bright red blood in his emesis. The patient denies diarrhea, but reports intermittent black stools with occasional red clots. While in the ED, Nursing reported a small, similar appearing and foul smelling bowel movement. The patient reports two episodes of syncope one two days prior to presentation and another just prior to presentation. He believes these were due to decreased intake. The patient denies a prior history of GI bleeding, but reports taking nadir seltzer as often as every 4 hours on a regular basis. He denies taking other over the counter medications. In the ED, the patient was found to have a hemoglobin of 7.9 with no prior available for comparison. Orthostatic blood pressure was negative, but the patient was found to have postural tachycardia.     Overview/Hospital Course:  5/12- Vitals are stable. S/P 1 unit of P-RBC transfusion with appropriate response . Hb 7.9> 9.3> 9.1. GI consult obtained and will have EGD today .Monitor H/H . Monitor Vitals . Continue PPI drip .     Interval History:   Vitals are stable. S/P 1 unit of P-RBC transfusion with appropriate response . Hb 7.9> 9.3> 9.1. GI consult  obtained and will have EGD today .Monitor H/H . Monitor Vitals . Continue PPI drip .     Review of Systems   Constitutional: Positive for fatigue. Negative for activity change, appetite change and fever.   HENT: Negative for sore throat.    Eyes: Negative for visual disturbance.   Respiratory: Negative for cough, chest tightness and shortness of breath.    Cardiovascular: Negative for chest pain, palpitations and leg swelling.   Gastrointestinal: Positive for blood in stool. Negative for abdominal distention, abdominal pain, constipation, diarrhea, nausea and vomiting.        Black stool    Endocrine: Negative for polyuria.   Genitourinary: Negative for decreased urine volume, dysuria, flank pain, frequency and hematuria.   Musculoskeletal: Negative for back pain and gait problem.   Skin: Negative for rash.   Neurological: Positive for syncope. Negative for speech difficulty, weakness, light-headedness and headaches.   Psychiatric/Behavioral: Negative for confusion, hallucinations and sleep disturbance.     Objective:     Vital Signs (Most Recent):  Temp: 99.2 °F (37.3 °C) (05/12/20 1306)  Pulse: 81 (05/12/20 1315)  Resp: 18 (05/12/20 1306)  BP: (!) 153/89 (05/12/20 1306)  SpO2: 97 % (05/12/20 1306) Vital Signs (24h Range):  Temp:  [97.8 °F (36.6 °C)-99.5 °F (37.5 °C)] 99.2 °F (37.3 °C)  Pulse:  [] 81  Resp:  [12-24] 18  SpO2:  [95 %-100 %] 97 %  BP: ()/(50-93) 153/89     Weight: 81.6 kg (179 lb 14.3 oz)  Body mass index is 28.18 kg/m².    Intake/Output Summary (Last 24 hours) at 5/12/2020 1404  Last data filed at 5/12/2020 1206  Gross per 24 hour   Intake 2185.42 ml   Output 150 ml   Net 2035.42 ml      Physical Exam   Constitutional: He is oriented to person, place, and time. He appears well-developed and well-nourished. No distress.   HENT:   Head: Normocephalic and atraumatic.   Mouth/Throat: No oropharyngeal exudate.   Eyes: Pupils are equal, round, and reactive to light. Conjunctivae and EOM are  normal.   Neck: Normal range of motion. Neck supple. No JVD present. No thyromegaly present.   Cardiovascular: Normal rate, regular rhythm and normal heart sounds.   No murmur heard.  Pulmonary/Chest: Effort normal and breath sounds normal. No respiratory distress. He has no wheezes. He has no rales. He exhibits no tenderness.   Abdominal: Soft. Bowel sounds are normal. He exhibits no distension. There is tenderness. There is no rebound and no guarding (Mild epigastric ).   Musculoskeletal: Normal range of motion. He exhibits no edema.   Lymphadenopathy:     He has no cervical adenopathy.   Neurological: He is alert and oriented to person, place, and time. He displays normal reflexes. No cranial nerve deficit or sensory deficit.   Skin: Skin is warm and dry. No rash noted. He is not diaphoretic.   Psychiatric: He has a normal mood and affect.       Significant Labs:   CBC:   Recent Labs   Lab 05/11/20 2150 05/12/20  0611 05/12/20  0803   WBC 9.44 7.24 7.35   HGB 7.9* 9.1* 9.3*   HCT 24.1* 27.9* 28.3*    191 184     CMP:   Recent Labs   Lab 05/11/20 2150 05/12/20  0611    140   K 3.8 3.9    108   CO2 26 24    94   BUN 30* 31*   CREATININE 0.9 0.8   CALCIUM 8.4* 7.7*   PROT 5.4*  --    ALBUMIN 2.9*  --    BILITOT 0.1  --    ALKPHOS 46*  --    AST 19  --    ALT 15  --    ANIONGAP 9 8   EGFRNONAA >60 >60       Significant Imaging:       Assessment/Plan:      * Upper GI bleed  -Possibly due to nadir stevetzer abuse.   -IV Protonix.   -Monitor hemoglobin and hematocrit.   -Transfuse as needed.   -GI consult.   5/12- S/P 1 unit of P-RBC transfusion . EGD today . Continue PPI infusion     Acute blood loss anemia  -Monitor hemoglobin and hematocrit.   -Transfuse as needed.       Syncope  -Most likely due to acute volume loss associated with GI bleeding.   -Monitor.   5/12- S/P 1 unit of P-RBC transfusion . Vitals are stable         VTE Risk Mitigation (From admission, onward)         Ordered     IP  VTE HIGH RISK PATIENT  Once      05/11/20 2332     Place sequential compression device  Until discontinued      05/11/20 2332                      Jack Hubbard MD  Department of Hospital Medicine   [facility]

## 2020-05-12 NOTE — ED NOTES
Pt lying in bed comfortably. AAO x 4. Skin warm and dry. Resp even and unlabored with equal chest rise and fall. Denies any needs or assist at this time. Will continue to monitor.

## 2020-05-12 NOTE — ED NOTES
"Patient sitting up in bed, no acute distress noted, awake, alert, and oriented x 3, calm, respirations even and unlabored, and skin is warm and dry. Martti currently in use in asking patient about transfusion reactions. Patient states " He feels fine, thanks be to God" per  Isaiah.  Side rails up x 2, call light in reach, bed low and locked.Will continue to monitor.  "

## 2020-05-12 NOTE — ASSESSMENT & PLAN NOTE
-Possibly due to nadir seltzer abuse.   -IV Protonix.   -Monitor hemoglobin and hematocrit.   -Transfuse as needed.   -GI consult.

## 2020-05-12 NOTE — SUBJECTIVE & OBJECTIVE
Interval History:   Vitals are stable. S/P 1 unit of P-RBC transfusion with appropriate response . Hb 7.9> 9.3> 9.1. GI consult obtained and will have EGD today .Monitor H/H . Monitor Vitals . Continue PPI drip .     Review of Systems   Constitutional: Positive for fatigue. Negative for activity change, appetite change and fever.   HENT: Negative for sore throat.    Eyes: Negative for visual disturbance.   Respiratory: Negative for cough, chest tightness and shortness of breath.    Cardiovascular: Negative for chest pain, palpitations and leg swelling.   Gastrointestinal: Positive for blood in stool. Negative for abdominal distention, abdominal pain, constipation, diarrhea, nausea and vomiting.        Black stool    Endocrine: Negative for polyuria.   Genitourinary: Negative for decreased urine volume, dysuria, flank pain, frequency and hematuria.   Musculoskeletal: Negative for back pain and gait problem.   Skin: Negative for rash.   Neurological: Positive for syncope. Negative for speech difficulty, weakness, light-headedness and headaches.   Psychiatric/Behavioral: Negative for confusion, hallucinations and sleep disturbance.     Objective:     Vital Signs (Most Recent):  Temp: 99.2 °F (37.3 °C) (05/12/20 1306)  Pulse: 81 (05/12/20 1315)  Resp: 18 (05/12/20 1306)  BP: (!) 153/89 (05/12/20 1306)  SpO2: 97 % (05/12/20 1306) Vital Signs (24h Range):  Temp:  [97.8 °F (36.6 °C)-99.5 °F (37.5 °C)] 99.2 °F (37.3 °C)  Pulse:  [] 81  Resp:  [12-24] 18  SpO2:  [95 %-100 %] 97 %  BP: ()/(50-93) 153/89     Weight: 81.6 kg (179 lb 14.3 oz)  Body mass index is 28.18 kg/m².    Intake/Output Summary (Last 24 hours) at 5/12/2020 1404  Last data filed at 5/12/2020 1206  Gross per 24 hour   Intake 2185.42 ml   Output 150 ml   Net 2035.42 ml      Physical Exam   Constitutional: He is oriented to person, place, and time. He appears well-developed and well-nourished. No distress.   HENT:   Head: Normocephalic and  atraumatic.   Mouth/Throat: No oropharyngeal exudate.   Eyes: Pupils are equal, round, and reactive to light. Conjunctivae and EOM are normal.   Neck: Normal range of motion. Neck supple. No JVD present. No thyromegaly present.   Cardiovascular: Normal rate, regular rhythm and normal heart sounds.   No murmur heard.  Pulmonary/Chest: Effort normal and breath sounds normal. No respiratory distress. He has no wheezes. He has no rales. He exhibits no tenderness.   Abdominal: Soft. Bowel sounds are normal. He exhibits no distension. There is tenderness. There is no rebound and no guarding (Mild epigastric ).   Musculoskeletal: Normal range of motion. He exhibits no edema.   Lymphadenopathy:     He has no cervical adenopathy.   Neurological: He is alert and oriented to person, place, and time. He displays normal reflexes. No cranial nerve deficit or sensory deficit.   Skin: Skin is warm and dry. No rash noted. He is not diaphoretic.   Psychiatric: He has a normal mood and affect.       Significant Labs:   CBC:   Recent Labs   Lab 05/11/20 2150 05/12/20  0611 05/12/20  0803   WBC 9.44 7.24 7.35   HGB 7.9* 9.1* 9.3*   HCT 24.1* 27.9* 28.3*    191 184     CMP:   Recent Labs   Lab 05/11/20 2150 05/12/20  0611    140   K 3.8 3.9    108   CO2 26 24    94   BUN 30* 31*   CREATININE 0.9 0.8   CALCIUM 8.4* 7.7*   PROT 5.4*  --    ALBUMIN 2.9*  --    BILITOT 0.1  --    ALKPHOS 46*  --    AST 19  --    ALT 15  --    ANIONGAP 9 8   EGFRNONAA >60 >60       Significant Imaging:

## 2020-05-12 NOTE — SUBJECTIVE & OBJECTIVE
History reviewed. No pertinent past medical history.    History reviewed. No pertinent surgical history.    Review of patient's allergies indicates:  No Known Allergies    No current facility-administered medications on file prior to encounter.      No current outpatient medications on file prior to encounter.     Family History     Reviewed and not pertinent.         Tobacco Use    Smoking status: Current Some Day Smoker   Substance and Sexual Activity    Alcohol use: Not Currently    Drug use: Never    Sexual activity: Not on file     Review of Systems   Constitutional: Positive for appetite change (decrease). Negative for chills, diaphoresis, fatigue and fever.   HENT: Negative for congestion, ear pain, mouth sores, sore throat and trouble swallowing.    Eyes: Negative for pain and visual disturbance.   Respiratory: Negative for cough, chest tightness and shortness of breath.    Cardiovascular: Negative for chest pain, palpitations and leg swelling.   Gastrointestinal: Positive for abdominal pain, blood in stool, nausea and vomiting. Negative for constipation and diarrhea.   Endocrine: Negative for cold intolerance, heat intolerance, polydipsia and polyuria.   Genitourinary: Negative for dysuria, frequency and hematuria.   Musculoskeletal: Negative for arthralgias, back pain, myalgias and neck pain.   Skin: Negative for pallor, rash and wound.   Allergic/Immunologic: Negative for environmental allergies and immunocompromised state.   Neurological: Positive for syncope. Negative for dizziness, seizures, weakness, numbness and headaches.   Hematological: Negative for adenopathy. Does not bruise/bleed easily.   Psychiatric/Behavioral: Negative for agitation, confusion and sleep disturbance.     Objective:     Vital Signs (Most Recent):  Temp: 99.4 °F (37.4 °C) (05/12/20 0235)  Pulse: 85 (05/12/20 0235)  Resp: 18 (05/12/20 0235)  BP: 124/73 (05/12/20 0235)  SpO2: 99 % (05/12/20 0235) Vital Signs (24h  Range):  Temp:  [98.2 °F (36.8 °C)-99.5 °F (37.5 °C)] 99.4 °F (37.4 °C)  Pulse:  [] 85  Resp:  [12-24] 18  SpO2:  [95 %-100 %] 99 %  BP: ()/(50-88) 124/73     Weight: 89.9 kg (198 lb 4.8 oz)  Body mass index is 31.06 kg/m².    Physical Exam   Constitutional: He is oriented to person, place, and time. He appears well-developed and well-nourished.   HENT:   Head: Normocephalic and atraumatic.   Eyes: Conjunctivae are normal.   Neck: Neck supple. No JVD present.   Cardiovascular: Normal rate, regular rhythm and normal heart sounds.   Pulmonary/Chest: Effort normal and breath sounds normal. He has no wheezes.   Abdominal: Soft. Bowel sounds are normal. He exhibits no distension. There is no tenderness.   Musculoskeletal: Normal range of motion.   Neurological: He is alert and oriented to person, place, and time.   Skin: Skin is warm and dry. No rash noted.   Psychiatric: He has a normal mood and affect. His behavior is normal. Thought content normal.   Nursing note and vitals reviewed.          Significant Labs:   CBC:   Recent Labs   Lab 05/11/20  2150   WBC 9.44   HGB 7.9*   HCT 24.1*        CMP:   Recent Labs   Lab 05/11/20  2150      K 3.8      CO2 26      BUN 30*   CREATININE 0.9   CALCIUM 8.4*   PROT 5.4*   ALBUMIN 2.9*   BILITOT 0.1   ALKPHOS 46*   AST 19   ALT 15   ANIONGAP 9   EGFRNONAA >60     All pertinent labs within the past 24 hours have been reviewed.    Significant Imaging: I have reviewed all pertinent imaging results/findings within the past 24 hours.

## 2020-05-12 NOTE — H&P
[facility] Hospital Medicine  History & Physical    Patient Name: Lorne Cartwright  MRN: [id number]  Admission Date: 5/11/2020  Attending Physician: Lori Junior MD   Primary Care Provider: No primary care provider on file.         Patient information was obtained from patient, past medical records and ER records.     Subjective:     Principal Problem:Upper GI bleed    Chief Complaint:   Chief Complaint   Patient presents with    Loss of Consciousness     black stool X 3 days        HPI: Lorne Cartwright is a 44 year old male with no past medical history who presented to the ED with reports of a 1 week history of epigastric abdominal pain, nausea, vomiting and black stools. The patient states that his epigastric abdominal pain is worse after eating and he feels that his food is not going down. He reports only intermittent emesis which is typically clear, but 2 days prior to presentation, he noted a small amount of bright red blood in his emesis. The patient denies diarrhea, but reports intermittent black stools with occasional red clots. While in the ED, Nursing reported a small, similar appearing and foul smelling bowel movement. The patient reports two episodes of syncope one two days prior to presentation and another just prior to presentation. He believes these were due to decreased intake. The patient denies a prior history of GI bleeding, but reports taking nadir seltzer as often as every 4 hours on a regular basis. He denies taking other over the counter medications. In the ED, the patient was found to have a hemoglobin of 7.9 with no prior available for comparison. Orthostatic blood pressure was negative, but the patient was found to have postural tachycardia.     History reviewed. No pertinent past medical history.    History reviewed. No pertinent surgical history.    Review of patient's allergies indicates:  No Known Allergies    No current facility-administered  medications on file prior to encounter.      No current outpatient medications on file prior to encounter.     Family History     Reviewed and not pertinent.         Tobacco Use    Smoking status: Current Some Day Smoker   Substance and Sexual Activity    Alcohol use: Not Currently    Drug use: Never    Sexual activity: Not on file     Review of Systems   Constitutional: Positive for appetite change (decrease). Negative for chills, diaphoresis, fatigue and fever.   HENT: Negative for congestion, ear pain, mouth sores, sore throat and trouble swallowing.    Eyes: Negative for pain and visual disturbance.   Respiratory: Negative for cough, chest tightness and shortness of breath.    Cardiovascular: Negative for chest pain, palpitations and leg swelling.   Gastrointestinal: Positive for abdominal pain, blood in stool, nausea and vomiting. Negative for constipation and diarrhea.   Endocrine: Negative for cold intolerance, heat intolerance, polydipsia and polyuria.   Genitourinary: Negative for dysuria, frequency and hematuria.   Musculoskeletal: Negative for arthralgias, back pain, myalgias and neck pain.   Skin: Negative for pallor, rash and wound.   Allergic/Immunologic: Negative for environmental allergies and immunocompromised state.   Neurological: Positive for syncope. Negative for dizziness, seizures, weakness, numbness and headaches.   Hematological: Negative for adenopathy. Does not bruise/bleed easily.   Psychiatric/Behavioral: Negative for agitation, confusion and sleep disturbance.     Objective:     Vital Signs (Most Recent):  Temp: 99.4 °F (37.4 °C) (05/12/20 0235)  Pulse: 85 (05/12/20 0235)  Resp: 18 (05/12/20 0235)  BP: 124/73 (05/12/20 0235)  SpO2: 99 % (05/12/20 0235) Vital Signs (24h Range):  Temp:  [98.2 °F (36.8 °C)-99.5 °F (37.5 °C)] 99.4 °F (37.4 °C)  Pulse:  [] 85  Resp:  [12-24] 18  SpO2:  [95 %-100 %] 99 %  BP: ()/(50-88) 124/73     Weight: 89.9 kg (198 lb 4.8 oz)  Body mass  index is 31.06 kg/m².    Physical Exam   Constitutional: He is oriented to person, place, and time. He appears well-developed and well-nourished.   HENT:   Head: Normocephalic and atraumatic.   Eyes: Conjunctivae are normal.   Neck: Neck supple. No JVD present.   Cardiovascular: Normal rate, regular rhythm and normal heart sounds.   Pulmonary/Chest: Effort normal and breath sounds normal. He has no wheezes.   Abdominal: Soft. Bowel sounds are normal. He exhibits no distension. There is no tenderness.   Musculoskeletal: Normal range of motion.   Neurological: He is alert and oriented to person, place, and time.   Skin: Skin is warm and dry. No rash noted.   Psychiatric: He has a normal mood and affect. His behavior is normal. Thought content normal.   Nursing note and vitals reviewed.          Significant Labs:   CBC:   Recent Labs   Lab 05/11/20 2150   WBC 9.44   HGB 7.9*   HCT 24.1*        CMP:   Recent Labs   Lab 05/11/20 2150      K 3.8      CO2 26      BUN 30*   CREATININE 0.9   CALCIUM 8.4*   PROT 5.4*   ALBUMIN 2.9*   BILITOT 0.1   ALKPHOS 46*   AST 19   ALT 15   ANIONGAP 9   EGFRNONAA >60     All pertinent labs within the past 24 hours have been reviewed.    Significant Imaging: I have reviewed all pertinent imaging results/findings within the past 24 hours.    Assessment/Plan:     * Upper GI bleed  -Possibly due to nadir seltzer abuse.   -IV Protonix.   -Monitor hemoglobin and hematocrit.   -Transfuse as needed.   -GI consult.     Acute blood loss anemia  -Monitor hemoglobin and hematocrit.   -Transfuse as needed.       Syncope  -Most likely due to acute volume loss associated with GI bleeding.   -Monitor.       VTE Risk Mitigation (From admission, onward)         Ordered     IP VTE HIGH RISK PATIENT  Once      05/11/20 2332     Place sequential compression device  Until discontinued      05/11/20 2332                   HARLEY Multani  Department of Hospital Medicine   [facility]

## 2020-05-12 NOTE — H&P (VIEW-ONLY)
[facility] -   Gastroenterology  Consult Note    Patient Name: Lorne Cartwright  MRN: [id number]  Admission Date: 5/11/2020  Hospital Length of Stay: 0 days  Code Status: Full Code   Attending Provider: Jack Hubbard MD   Consulting Provider: Sydnie Whalen PA-C  Primary Care Physician: No primary care provider on file.  Principal Problem:Upper GI bleed    Inpatient consult to Gastroenterology  Consult performed by: Sydnie Whalen PA-C  Consult ordered by: HARLEY Multani  Reason for consult: upper GI bleed        Subjective:     HPI:  Patient is a 43yo male who presented to the ER via ambulance after an episode of syncope. Patient states that 2 weeks ago he began feeling some upper abdominal discomfort and nausea. Has had decreased appetite. He has noticed over the past few days that his stools had turned black. He mentions this has happened once before but he ignored it. He reports that he actually had an additional syncopal episode this past Saturday. Bowels have been moving normally with no constipation or diarrhea. Last BM was while he was passed out yesterday. Has not had a stool while in the hospital. Denies current nausea or abdominal pain. Was started on PPI BID yesterday and made NPO. Hgb on admit was 7.9. Transfused 2 units and notes much improvement. He has no current complaints. Denies NSAID use.    Jennifer was used for HPI.  number [id number].    History reviewed. No pertinent past medical history.    History reviewed. No pertinent surgical history.    Review of patient's allergies indicates:  No Known Allergies  Family History     None        Tobacco Use    Smoking status: Current Some Day Smoker   Substance and Sexual Activity    Alcohol use: Not Currently    Drug use: Never    Sexual activity: Not on file     Review of Systems   Constitutional: Negative for appetite change, chills, diaphoresis and fatigue.   HENT: Negative for trouble swallowing.     Respiratory: Negative for chest tightness and shortness of breath.    Cardiovascular: Negative for chest pain.   Gastrointestinal: Positive for blood in stool (Although no stool since admission). Negative for abdominal pain, constipation, diarrhea, nausea and vomiting.   Genitourinary: Negative for dysuria.   Musculoskeletal: Negative for back pain.   Neurological: Negative for dizziness, weakness and light-headedness.   Psychiatric/Behavioral: Negative for agitation and dysphoric mood. The patient is not nervous/anxious.      Objective:     Vital Signs (Most Recent):  Temp: 98.4 °F (36.9 °C) (05/12/20 0734)  Pulse: 83 (05/12/20 0734)  Resp: 18 (05/12/20 0734)  BP: 121/67 (05/12/20 0734)  SpO2: 98 % (05/12/20 0734) Vital Signs (24h Range):  Temp:  [98.2 °F (36.8 °C)-99.5 °F (37.5 °C)] 98.4 °F (36.9 °C)  Pulse:  [] 83  Resp:  [12-24] 18  SpO2:  [95 %-100 %] 98 %  BP: ()/(50-88) 121/67     Weight: 81.6 kg (179 lb 14.3 oz) (05/12/20 0331)  Body mass index is 28.18 kg/m².      Intake/Output Summary (Last 24 hours) at 5/12/2020 0917  Last data filed at 5/12/2020 0440  Gross per 24 hour   Intake 1755.42 ml   Output --   Net 1755.42 ml       Lines/Drains/Airways     Peripheral Intravenous Line                 Peripheral IV - Single Lumen 05/11/20 18 G Left Antecubital 1 day         Peripheral IV - Single Lumen 05/11/20 2142 20 G Right Antecubital less than 1 day                Physical Exam   Constitutional: He is oriented to person, place, and time. He appears well-developed and well-nourished. No distress.   HENT:   Head: Normocephalic and atraumatic.   Eyes: EOM are normal. Right eye exhibits no discharge. Left eye exhibits no discharge. No scleral icterus.   Neck: Normal range of motion.   Cardiovascular: Normal rate, regular rhythm and normal heart sounds.   No murmur heard.  Pulmonary/Chest: Effort normal and breath sounds normal. No stridor. No respiratory distress. He has no wheezes.   Abdominal:  Soft. Bowel sounds are normal. He exhibits no distension. There is no tenderness. There is no guarding.   Musculoskeletal: Normal range of motion.   Neurological: He is alert and oriented to person, place, and time.   Skin: Skin is warm and dry. No rash noted. He is not diaphoretic. No erythema. No pallor.   Psychiatric: He has a normal mood and affect. His behavior is normal. Judgment and thought content normal.       Significant Labs:  CBC:   Recent Labs   Lab 05/11/20 2150 05/12/20  0611 05/12/20  0803   WBC 9.44 7.24 7.35   HGB 7.9* 9.1* 9.3*   HCT 24.1* 27.9* 28.3*    191 184     CMP:   Recent Labs   Lab 05/11/20 2150 05/12/20  0611    94   CALCIUM 8.4* 7.7*   ALBUMIN 2.9*  --    PROT 5.4*  --     140   K 3.8 3.9   CO2 26 24    108   BUN 30* 31*   CREATININE 0.9 0.8   ALKPHOS 46*  --    ALT 15  --    AST 19  --    BILITOT 0.1  --      Coagulation:   Recent Labs   Lab 05/11/20 2150   INR 1.1       Significant Imaging:  Imaging results within the past 24 hours have been reviewed.    Assessment/Plan:     * Upper GI bleed  -Continue to monitor vitals, Hgb, Hct.  -Transfuse as needed.  -NPO status  -Will complete EGD today.  -Continue PPI BID.  -IV fluids.          Thank you for your consult. I will follow-up with patient. Please contact us if you have any additional questions.    Sydnie Whalen PA-C  Gastroenterology  [facility]

## 2020-05-12 NOTE — ED NOTES
2nd unit of blood transfusing. Patient denies any shortness of breath, back pain, chest pain, itching. Pt lying in bed comfortably. AAO x 4. Skin warm and dry. Resp even and unlabored with equal chest rise and fall. Denies any needs or assist at this time.

## 2020-05-12 NOTE — PLAN OF CARE
Patient AAOX 4. VSS..  Patient remained afebrile throughout shift.  Patient remained free of falls this shift  Received 1 Unit of blood  Plan of care reviewed.  Patient verbalized understanding.  Patient moving/turning independently  Frequent weight shifting encouraged.  Patient NSR on monitor  Bed low, side rails up x2, wheels locked, call light in reach.  Bed alarm maintained for safety.  Patient instructed to call for assistance.  Hourly rounding completed.  Will continue to monitor.

## 2020-05-12 NOTE — ASSESSMENT & PLAN NOTE
Status post  Endoscopy from the with a bleeding gastric ulcer  Intervened with epi injection and cautery  No further bleeding noted   Monitor H&H for further signs of bleeding  If patient rebleeds and GI feels that patient is not a candidate for any further intervention endoscopically will be available for surgical intervention if necessary  Agree with medical therapy  Follow-up biopsies and cause of ulceration  Call with questions

## 2020-05-12 NOTE — HPI
44-year-old male referred for gastric ulcer.  The patient presented with hematemesis and underwent EGD where he was found to have a gastric ulcer along the antrum which was bleeding.  The ulcer was injected and cauterized with no further bleeding.  Patient currently tolerating clear liquids with no abdominal pain.  Denies any prior episodes.  Endorses tobacco use and social alcohol use

## 2020-05-12 NOTE — [employer] PATIENT INSTRUCTIONS
Discharge Summary/Instructions after an Endoscopic Procedure  Patient Name: Lorne Cartwright  Patient MRN: [id number]  Patient   YOB: 1975  Tuesday, May 12, 2020 Tata Domingo MD  RESTRICTIONS:  During your procedure today, you received medications for sedation.  These   medications may affect your judgment, balance and coordination.  Therefore,   for 24 hours, you have the following restrictions:   - DO NOT drive a car, operate machinery, make legal/financial decisions,   sign important papers or drink alcohol.    ACTIVITY:  Today: no heavy lifting, straining or running due to procedural   sedation/anesthesia.  The following day: return to full activity including work.  DIET:  Eat and drink normally unless instructed otherwise.     TREATMENT FOR COMMON SIDE EFFECTS:  - Mild abdominal pain, nausea, belching, bloating or excessive gas:  rest,   eat lightly and use a heating pad.  - Sore Throat: treat with throat lozenges and/or gargle with warm salt   water.  - Because air was used during the procedure, expelling large amounts of air   from your rectum or belching is normal.  - If a bowel prep was taken, you may not have a bowel movement for 1-3 days.    This is normal.  SYMPTOMS TO WATCH FOR AND REPORT TO YOUR PHYSICIAN:  1. Abdominal pain or bloating, other than gas cramps.  2. Chest pain.  3. Back pain.  4. Signs of infection such as: chills or fever occurring within 24 hours   after the procedure.  5. Rectal bleeding, which would show as bright red, maroon, or black stools.   (A tablespoon of blood from the rectum is not serious, especially if   hemorrhoids are present.)  6. Vomiting.  7. Weakness or dizziness.  GO DIRECTLY TO THE NEAREST EMERGENCY ROOM IF YOU HAVE ANY OF THE FOLLOWING:      Difficulty breathing              Chills and/or fever over 101 F   Persistent vomiting and/or vomiting blood   Severe abdominal pain   Severe chest pain   Black, tarry stools   Bleeding- more than one  tablespoon   Any other symptom or condition that you feel may need urgent attention  Your doctor recommends these additional instructions:  If any biopsies were taken, your doctors clinic will contact you in 1 to 2   weeks with any results.  - Await pathology results.   - Give Protonix (pantoprazole): 8 mg/hr IV by continuous infusion for 2   days.   - Use sucralfate suspension 1 gram PO QID for 1 week.   - Clear liquid diet.   - No aspirin, ibuprofen, naproxen, or other non-steroidal anti-inflammatory   drugs.   - Observe patient's clinical course.   - The findings and recommendations were discussed with the referring   physician, Dr. Hubbard, [facility] medicine.   - Consider surgery input given size of large excavated ulcer.  - [facility] to [facility] for [facility] care.  For questions, problems or results please call your physician Tata Domingo MD at Work:  [phone]  If you have any questions about the above instructions, call the GI   department at [phone] or call the endoscopy unit at [phone]   from 7am until 3 pm.  [facility], EMERGENCY ROOM PHONE NUMBER:   [phone]  IF A COMPLICATION OR EMERGENCY SITUATION ARISES AND YOU ARE UNABLE TO REACH   YOUR PHYSICIAN - GO DIRECTLY TO THE EMERGENCY ROOM.  I have read or have had read to me these discharge instructions for my   procedure and have received a written copy.  I understand these   instructions and will follow-up with my physician if I have any questions.     __________________________________       _____________________________________  Nurse Signature                                          Patient/Designated   Responsible Party Signature  MD Tata Salcido MD  5/12/2020 11:36:17 AM  This report has been verified and signed electronically.  [employer]

## 2020-05-12 NOTE — PLAN OF CARE
Dr. Domingo at bedside, Jennifer in use for translation, discussed procedure and findings with pt , understanding verbalized

## 2020-05-12 NOTE — CONSULTS
[facility] -   General Surgery  Consult Note    Patient Name: Lorne Cartwrigth  MRN: [id number]  Code Status: Full Code  Admission Date: 5/11/2020  Hospital Length of Stay: 0 days  Attending Physician: Jack Hubbard MD  Primary Care Provider: No primary care provider on file.    Patient information was obtained from patient.     Consults  Subjective:     Principal Problem: Upper GI bleed    History of Present Illness: 44-year-old male referred for gastric ulcer.  The patient presented with hematemesis and underwent EGD where he was found to have a gastric ulcer along the antrum which was bleeding.  The ulcer was injected and cauterized with no further bleeding.  Patient currently tolerating clear liquids with no abdominal pain.  Denies any prior episodes.  Endorses tobacco use and social alcohol use    No current facility-administered medications on file prior to encounter.      No current outpatient medications on file prior to encounter.       Review of patient's allergies indicates:  No Known Allergies    History reviewed. No pertinent past medical history.  History reviewed. No pertinent surgical history.  Family History     None        Tobacco Use    Smoking status: Current Some Day Smoker    Smokeless tobacco: Never Used   Substance and Sexual Activity    Alcohol use: Not Currently    Drug use: Never    Sexual activity: Not on file     Review of Systems   Constitutional: Negative for chills, fever and unexpected weight change.   HENT: Negative for congestion.    Eyes: Negative for visual disturbance.   Respiratory: Negative for shortness of breath.    Cardiovascular: Negative for chest pain.   Gastrointestinal: Positive for blood in stool ( melena). Negative for abdominal distention, abdominal pain, constipation, nausea, rectal pain and vomiting.   Genitourinary: Negative for dysuria.   Musculoskeletal: Negative for arthralgias.   Skin: Negative for rash.   Neurological: Positive  for syncope. Negative for light-headedness.   Hematological: Negative for adenopathy.     Objective:     Vital Signs (Most Recent):  Temp: 99.2 °F (37.3 °C) (05/12/20 1306)  Pulse: 81 (05/12/20 1315)  Resp: 18 (05/12/20 1306)  BP: (!) 153/89 (05/12/20 1306)  SpO2: 97 % (05/12/20 1306) Vital Signs (24h Range):  Temp:  [97.8 °F (36.6 °C)-99.5 °F (37.5 °C)] 99.2 °F (37.3 °C)  Pulse:  [] 81  Resp:  [12-24] 18  SpO2:  [95 %-100 %] 97 %  BP: ()/(50-93) 153/89     Weight: 81.6 kg (179 lb 14.3 oz)  Body mass index is 28.18 kg/m².    Physical Exam   Constitutional: He is oriented to person, place, and time. He appears well-developed and well-nourished.   HENT:   Head: Normocephalic and atraumatic.   Eyes: EOM are normal.   Neck: Normal range of motion. Neck supple.   Cardiovascular: Normal rate and regular rhythm.   Pulmonary/Chest: Effort normal and breath sounds normal.   Abdominal: Soft. Bowel sounds are normal. He exhibits no distension. There is no tenderness.   Neurological: He is alert and oriented to person, place, and time.   Skin: Skin is warm and dry.   Vitals reviewed.      Significant Labs:  CBC:   Recent Labs   Lab 05/12/20  1525   WBC 7.89   RBC 3.16*   HGB 9.2*   HCT 28.0*      MCV 89   MCH 29.1   MCHC 32.9     CMP:   Recent Labs   Lab 05/11/20 2150 05/12/20  0611    94   CALCIUM 8.4* 7.7*   ALBUMIN 2.9*  --    PROT 5.4*  --     140   K 3.8 3.9   CO2 26 24    108   BUN 30* 31*   CREATININE 0.9 0.8   ALKPHOS 46*  --    ALT 15  --    AST 19  --    BILITOT 0.1  --      Coagulation:   Recent Labs   Lab 05/11/20  2150   LABPROT 11.3   INR 1.1       Significant Diagnostics:  I have reviewed all pertinent imaging results/findings within the past 24 hours.     Impression:    - Normal duodenal bulb and second portion of the                         duodenum.                        - Oozing gastric ulcer with a visible vessel.                         Injected. Treated with bipolar  cautery. Biopsied.                        - Z-line regular.                        - No gross lesions in esophagus.  Recommendation:       - Await pathology results.                        - Give Protonix (pantoprazole): 8 mg/hr IV by                         continuous infusion for 2 days.                        - Use sucralfate suspension 1 gram PO QID for 1                         week.                        - Clear liquid diet.                        - No aspirin, ibuprofen, naproxen, or other                         non-steroidal anti-inflammatory drugs.                        - Observe patient's clinical course.                        - The findings and recommendations were discussed                         with the referring physician, Dr. Hubbard, [facility] [place]                         medicine.                        - Consider surgery input given size of large                         excavated ulcer.                        - [facility] patient to [facility] for ongoing care.    Assessment/Plan:     * Upper GI bleed  Status post  Endoscopy from the with a bleeding gastric ulcer  Intervened with epi injection and cautery  No further bleeding noted   Monitor H&H for further signs of bleeding  If patient rebleeds and GI feels that patient is not a candidate for any further intervention endoscopically will be available for surgical intervention if necessary  Agree with medical therapy  Follow-up biopsies and cause of ulceration  Call with questions        VTE Risk Mitigation (From admission, onward)         Ordered     IP VTE HIGH RISK PATIENT  Once      05/11/20 2332     Place sequential compression device  Until discontinued      05/11/20 2332                Thank you for your consult. I will follow-up with patient. Please contact us if you have any additional questions.    Tomas Cheng MD  General Surgery  [facility]

## 2020-05-12 NOTE — SUBJECTIVE & OBJECTIVE
No current facility-administered medications on file prior to encounter.      No current outpatient medications on file prior to encounter.       Review of patient's allergies indicates:  No Known Allergies    History reviewed. No pertinent past medical history.  History reviewed. No pertinent surgical history.  Family History     None        Tobacco Use    Smoking status: Current Some Day Smoker    Smokeless tobacco: Never Used   Substance and Sexual Activity    Alcohol use: Not Currently    Drug use: Never    Sexual activity: Not on file     Review of Systems   Constitutional: Negative for chills, fever and unexpected weight change.   HENT: Negative for congestion.    Eyes: Negative for visual disturbance.   Respiratory: Negative for shortness of breath.    Cardiovascular: Negative for chest pain.   Gastrointestinal: Positive for blood in stool ( melena). Negative for abdominal distention, abdominal pain, constipation, nausea, rectal pain and vomiting.   Genitourinary: Negative for dysuria.   Musculoskeletal: Negative for arthralgias.   Skin: Negative for rash.   Neurological: Positive for syncope. Negative for light-headedness.   Hematological: Negative for adenopathy.     Objective:     Vital Signs (Most Recent):  Temp: 99.2 °F (37.3 °C) (05/12/20 1306)  Pulse: 81 (05/12/20 1315)  Resp: 18 (05/12/20 1306)  BP: (!) 153/89 (05/12/20 1306)  SpO2: 97 % (05/12/20 1306) Vital Signs (24h Range):  Temp:  [97.8 °F (36.6 °C)-99.5 °F (37.5 °C)] 99.2 °F (37.3 °C)  Pulse:  [] 81  Resp:  [12-24] 18  SpO2:  [95 %-100 %] 97 %  BP: ()/(50-93) 153/89     Weight: 81.6 kg (179 lb 14.3 oz)  Body mass index is 28.18 kg/m².    Physical Exam   Constitutional: He is oriented to person, place, and time. He appears well-developed and well-nourished.   HENT:   Head: Normocephalic and atraumatic.   Eyes: EOM are normal.   Neck: Normal range of motion. Neck supple.   Cardiovascular: Normal rate and regular rhythm.    Pulmonary/Chest: Effort normal and breath sounds normal.   Abdominal: Soft. Bowel sounds are normal. He exhibits no distension. There is no tenderness.   Neurological: He is alert and oriented to person, place, and time.   Skin: Skin is warm and dry.   Vitals reviewed.      Significant Labs:  CBC:   Recent Labs   Lab 05/12/20  1525   WBC 7.89   RBC 3.16*   HGB 9.2*   HCT 28.0*      MCV 89   MCH 29.1   MCHC 32.9     CMP:   Recent Labs   Lab 05/11/20 2150 05/12/20  0611    94   CALCIUM 8.4* 7.7*   ALBUMIN 2.9*  --    PROT 5.4*  --     140   K 3.8 3.9   CO2 26 24    108   BUN 30* 31*   CREATININE 0.9 0.8   ALKPHOS 46*  --    ALT 15  --    AST 19  --    BILITOT 0.1  --      Coagulation:   Recent Labs   Lab 05/11/20 2150   LABPROT 11.3   INR 1.1       Significant Diagnostics:  I have reviewed all pertinent imaging results/findings within the past 24 hours.     Impression:    - Normal duodenal bulb and second portion of the                         duodenum.                        - Oozing gastric ulcer with a visible vessel.                         Injected. Treated with bipolar cautery. Biopsied.                        - Z-line regular.                        - No gross lesions in esophagus.  Recommendation:       - Await pathology results.                        - Give Protonix (pantoprazole): 8 mg/hr IV by                         continuous infusion for 2 days.                        - Use sucralfate suspension 1 gram PO QID for 1                         week.                        - Clear liquid diet.                        - No aspirin, ibuprofen, naproxen, or other                         non-steroidal anti-inflammatory drugs.                        - Observe patient's clinical course.                        - The findings and recommendations were discussed                         with the referring physician, Dr. Hubbard, [facility]                         medicine.                         - Consider surgery input given size of large                         excavated ulcer.                        - [facility].

## 2020-05-12 NOTE — HOSPITAL COURSE
5/12- Vitals are stable. S/P 1 unit of P-RBC transfusion with appropriate response . Hb 7.9> 9.3> 9.1. GI consult obtained and will have EGD today .Monitor H/H . Monitor Vitals . Continue PPI drip .     5/13- S/P EGD yesterday and noted to have Normal esophagus and duodenum and large excavated oozing gastric ulcer with a visible vessel treated with bipolar cautery and biopsied. General Surgery consult obtained and plan is noted. Continue IV PPI ggt for 72h per GI recommendation. H/H are fairly stable with a slight drop since EGD. Hb 9.2> 8.7>8.5. Vitals are stable . Pt denies abd pain , denies any vomiting or black stool or blood in the stool.     5/14- Pt remained stable with stable vitals and stable H/H. He was given Iron Sucrose 200 mg daily x 2 dose. He had received one unit of P-RBC on the day of admit. Completed 72h IV protonix ggt  and switch to oral PPI today . GI has cleared pt for discharge to home today and follow up in the clinic in 2 weeks. He will continue Protonix 40 mg bid x 1 mos then one po daily thereafter and Carafate 1 gram po 4x daily x 1 week. Advised not to take Aleve , Ibuprofen or Aspirin product.

## 2020-05-12 NOTE — PLAN OF CARE
Bedside report given to TALHA Sanchez, observed nurse hanging protonix, IV, pt stable,no nausea or vomiting, denies distress or pain , appears comfortable

## 2020-05-12 NOTE — ASSESSMENT & PLAN NOTE
-Continue to monitor vitals, Hgb, Hct.  -Transfuse as needed.  -NPO status  -Will complete EGD today.  -Continue PPI BID.  -IV fluids.

## 2020-05-12 NOTE — ED PROVIDER NOTES
SCRIBE #1 NOTE: I, Andria Benz, am scribing for, and in the presence of, Lori Junior MD. I have scribed the entire note.       History     Chief Complaint   Patient presents with    Loss of Consciousness     black stool X 3 days     Review of patient's allergies indicates:  No Known Allergies      History of Present Illness     HPI    5/11/2020, 9:20 PM  History obtained from the patient      History of Present Illness: Lorne Cartwright is a 44 y.o. male patient who presents to the Emergency Department for evaluation s/p syncopal episode which onset PTA. He reports N/V/D, melena, and epigastric pain for past 3 days. Symptoms are episodic and moderate in severity. No mitigating or exacerbating factors reported. Associated sxs include generalized weakness.  Patient denies any head injury, HA, dizziness, neck pain, CP, SOB, back pain, hip pain, knee pain, hematuria, dysuria, urgency, frequency, CP, SOB, cough, and all other sxs at this time. No further complaints or concerns at this time.         Arrival mode: [facility]    PCP: No primary care provider on file.        Past Medical History:  History reviewed. No pertinent past medical history.    Past Surgical History:  History reviewed. No pertinent past surgical history.    Family History:  History reviewed. No pertinent family history.    Social History:  Social History     Tobacco Use    Smoking status: Unknown   Substance and Sexual Activity    Alcohol use: Unknown    Drug use: Unknown    Sexual activity: Unknown        Review of Systems     Review of Systems   Constitutional: Negative for fever.        + generalized weakness   HENT: Negative for sore throat.         - head injury   Respiratory: Negative for cough and shortness of breath.    Cardiovascular: Negative for chest pain.   Gastrointestinal: Positive for abdominal pain (epigastric), blood in stool (melena), diarrhea, nausea and vomiting.   Genitourinary: Negative for dysuria,  frequency, hematuria and urgency.   Musculoskeletal: Negative for back pain and neck pain.        - hip pain, knee pain   Skin: Negative for rash.   Neurological: Positive for syncope. Negative for dizziness, weakness and headaches.   Hematological: Does not bruise/bleed easily.   All other systems reviewed and are negative.       Physical Exam     Initial Vitals [05/11/20 2121]   BP Pulse Resp Temp SpO2   (!) 94/50 (!) 127 20 98.2 °F (36.8 °C) 95 %      MAP       --          Physical Exam  Nursing Notes and Vital Signs Reviewed.  Constitutional: Patient is in no acute distress. Well-developed and well-nourished.  Head: Atraumatic. Normocephalic.  Eyes: PERRL. EOM intact. Conjunctivae are not pale. No scleral icterus.  ENT: Mucous membranes are moist. Oropharynx is clear and symmetric.    Neck: Supple. Full ROM. No lymphadenopathy.  Cardiovascular: Tachycardic. Regular rhythm. No murmurs, rubs, or gallops. Distal pulses are 2+ and symmetric.  Pulmonary/Chest: No respiratory distress. Clear to auscultation bilaterally. No wheezing or rales.  Abdominal: Soft and non-distended.  There is epigastric tenderness.  No rebound, guarding, or rigidity. Good bowel sounds.  Genitourinary: No CVA tenderness  Rectal:  No tenderness.  No masses.  No hemorrhoids.  Normal sphincter tone.  Liquid melanotic stool noted.  Musculoskeletal: Moves all extremities. No obvious deformities. No edema. No calf tenderness.  Skin: Pale  Neurological:  Alert, awake, and appropriate.  Normal speech.  No acute focal neurological deficits are appreciated.  Psychiatric: Normal affect. Good eye contact. Appropriate in content.     ED Course   Critical Care  Date/Time: 5/11/2020 11:02 PM  Performed by: Lori Junior MD  Authorized by: Lori Junior MD   Direct patient critical care time: 30 minutes  Additional history critical care time: 6 minutes  Ordering / reviewing critical care time: 6 minutes  Documentation critical care time: 6  minutes  Consulting other physicians critical care time: 6 minutes  Consult with family critical care time: 6 minutes  Total critical care time (exclusive of procedural time) : 60 minutes  Critical care time was exclusive of separately billable procedures and treating other patients and teaching time.  Critical care was necessary to treat or prevent imminent or life-threatening deterioration of the following conditions: GI bleed requiring blood transfusion.  Critical care was time spent personally by me on the following activities: blood draw for specimens, development of treatment plan with patient or surrogate, discussions with consultants, evaluation of patient's response to treatment, interpretation of cardiac output measurements, examination of patient, obtaining history from patient or surrogate, ordering and performing treatments and interventions, ordering and review of laboratory studies, re-evaluation of patient's condition and pulse oximetry.        ED Vital Signs:  Vitals:    05/13/20 1504 05/13/20 1614 05/13/20 1710 05/13/20 1918   BP:  134/80  125/69   Pulse: 72 73 91 80   Resp:  20  20   Temp:  98.8 °F (37.1 °C)  98.6 °F (37 °C)   TempSrc:  Oral  Oral   SpO2:  98%  98%   Weight:       Height:        05/13/20 1947 05/13/20 2155 05/13/20 2333 05/13/20 2351   BP:    117/69   Pulse: 82 77 81 79   Resp: 18   18   Temp:    98.9 °F (37.2 °C)   TempSrc:    Oral   SpO2: 97%   98%   Weight:       Height:        05/14/20 0129 05/14/20 0305 05/14/20 0320 05/14/20 0535   BP:  126/70     Pulse: 78 78 80 72   Resp:  18     Temp:  98.5 °F (36.9 °C)     TempSrc:  Oral     SpO2:  98%     Weight:       Height:        05/14/20 0710 05/14/20 0921 05/14/20 1241   BP: 114/65  128/63   Pulse: 77 78 84   Resp: 16 18 20   Temp: 98.3 °F (36.8 °C)  99 °F (37.2 °C)   TempSrc: Oral  Oral   SpO2: 96% 96% 96%   Weight:      Height:          Abnormal Lab Results:  Labs Reviewed   CBC W/ AUTO DIFFERENTIAL - Abnormal; Notable for the  following components:       Result Value    RBC 2.68 (*)     Hemoglobin 7.9 (*)     Hematocrit 24.1 (*)     Immature Granulocytes 0.7 (*)     Immature Grans (Abs) 0.07 (*)     Gran% 78.0 (*)     Lymph% 12.5 (*)     All other components within normal limits   COMPREHENSIVE METABOLIC PANEL - Abnormal; Notable for the following components:    BUN, Bld 30 (*)     Calcium 8.4 (*)     Total Protein 5.4 (*)     Albumin 2.9 (*)     Alkaline Phosphatase 46 (*)     All other components within normal limits   URINALYSIS - Abnormal; Notable for the following components:    Ketones, UA 1+ (*)     All other components within normal limits   HIV 1 / 2 ANTIBODY   PROTIME-INR   LACTIC ACID, PLASMA   SARS-COV-2 RNA AMPLIFICATION, QUAL   TYPE & SCREEN   PREPARE RBC SOFT        All Lab Results:  Results for orders placed or performed during the hospital encounter of 05/11/20   Blood Culture #1 **CANNOT BE ORDERED STAT**   Result Value Ref Range    Blood Culture, Routine No Growth to date     Blood Culture, Routine No Growth to date     Blood Culture, Routine No Growth to date     Blood Culture, Routine No Growth to date    Blood Culture #1 **CANNOT BE ORDERED STAT**   Result Value Ref Range    Blood Culture, Routine No Growth to date     Blood Culture, Routine No Growth to date     Blood Culture, Routine No Growth to date     Blood Culture, Routine No Growth to date    HIV 1/2 Ag/Ab (4th Gen)   Result Value Ref Range    HIV 1/2 Ag/Ab Negative Negative   CBC auto differential   Result Value Ref Range    WBC 9.44 3.90 - 12.70 K/uL    RBC 2.68 (L) 4.60 - 6.20 M/uL    Hemoglobin 7.9 (L) 14.0 - 18.0 g/dL    Hematocrit 24.1 (L) 40.0 - 54.0 %    Mean Corpuscular Volume 90 82 - 98 fL    Mean Corpuscular Hemoglobin 29.5 27.0 - 31.0 pg    Mean Corpuscular Hemoglobin Conc 32.8 32.0 - 36.0 g/dL    RDW 12.0 11.5 - 14.5 %    Platelets 226 150 - 350 K/uL    MPV 10.5 9.2 - 12.9 fL    Immature Granulocytes 0.7 (H) 0.0 - 0.5 %    Gran # (ANC) 7.4 1.8 -  7.7 K/uL    Immature Grans (Abs) 0.07 (H) 0.00 - 0.04 K/uL    Lymph # 1.2 1.0 - 4.8 K/uL    Mono # 0.8 0.3 - 1.0 K/uL    Eos # 0.0 0.0 - 0.5 K/uL    Baso # 0.03 0.00 - 0.20 K/uL    nRBC 0 0 /100 WBC    Gran% 78.0 (H) 38.0 - 73.0 %    Lymph% 12.5 (L) 18.0 - 48.0 %    Mono% 8.2 4.0 - 15.0 %    Eosinophil% 0.3 0.0 - 8.0 %    Basophil% 0.3 0.0 - 1.9 %    Differential Method Automated    Comprehensive metabolic panel   Result Value Ref Range    Sodium 141 136 - 145 mmol/L    Potassium 3.8 3.5 - 5.1 mmol/L    Chloride 106 95 - 110 mmol/L    CO2 26 23 - 29 mmol/L    Glucose 107 70 - 110 mg/dL    BUN, Bld 30 (H) 6 - 20 mg/dL    Creatinine 0.9 0.5 - 1.4 mg/dL    Calcium 8.4 (L) 8.7 - 10.5 mg/dL    Total Protein 5.4 (L) 6.0 - 8.4 g/dL    Albumin 2.9 (L) 3.5 - 5.2 g/dL    Total Bilirubin 0.1 0.1 - 1.0 mg/dL    Alkaline Phosphatase 46 (L) 55 - 135 U/L    AST 19 10 - 40 U/L    ALT 15 10 - 44 U/L    Anion Gap 9 8 - 16 mmol/L    eGFR if African American >60 >60 mL/min/1.73 m^2    eGFR if non African American >60 >60 mL/min/1.73 m^2   Protime-INR   Result Value Ref Range    Prothrombin Time 11.3 9.0 - 12.5 sec    INR 1.1 0.8 - 1.2   Urinalysis - clean catch   Result Value Ref Range    Specimen UA Urine, Clean Catch     Color, UA Yellow Yellow, Straw, Sonya    Appearance, UA Clear Clear    pH, UA 7.0 5.0 - 8.0    Specific Gravity, UA 1.020 1.005 - 1.030    Protein, UA Negative Negative    Glucose, UA Negative Negative    Ketones, UA 1+ (A) Negative    Bilirubin (UA) Negative Negative    Occult Blood UA Negative Negative    Nitrite, UA Negative Negative    Urobilinogen, UA Negative <2.0 EU/dL    Leukocytes, UA Negative Negative   Lactic acid, plasma   Result Value Ref Range    Lactate (Lactic Acid) 2.2 0.5 - 2.2 mmol/L   COVID-19 Rapid Screening   Result Value Ref Range    SARS-CoV-2 RNA, Amplification, Qual Negative Negative   CBC auto differential   Result Value Ref Range    WBC 7.24 3.90 - 12.70 K/uL    RBC 3.13 (L) 4.60 - 6.20  M/uL    Hemoglobin 9.1 (L) 14.0 - 18.0 g/dL    Hematocrit 27.9 (L) 40.0 - 54.0 %    Mean Corpuscular Volume 89 82 - 98 fL    Mean Corpuscular Hemoglobin 29.1 27.0 - 31.0 pg    Mean Corpuscular Hemoglobin Conc 32.6 32.0 - 36.0 g/dL    RDW 12.6 11.5 - 14.5 %    Platelets 191 150 - 350 K/uL    MPV 10.7 9.2 - 12.9 fL    Immature Granulocytes 0.6 (H) 0.0 - 0.5 %    Gran # (ANC) 4.8 1.8 - 7.7 K/uL    Immature Grans (Abs) 0.04 0.00 - 0.04 K/uL    Lymph # 1.8 1.0 - 4.8 K/uL    Mono # 0.6 0.3 - 1.0 K/uL    Eos # 0.0 0.0 - 0.5 K/uL    Baso # 0.01 0.00 - 0.20 K/uL    nRBC 0 0 /100 WBC    Gran% 65.7 38.0 - 73.0 %    Lymph% 25.3 18.0 - 48.0 %    Mono% 8.0 4.0 - 15.0 %    Eosinophil% 0.3 0.0 - 8.0 %    Basophil% 0.1 0.0 - 1.9 %    Differential Method Automated    Basic metabolic panel   Result Value Ref Range    Sodium 140 136 - 145 mmol/L    Potassium 3.9 3.5 - 5.1 mmol/L    Chloride 108 95 - 110 mmol/L    CO2 24 23 - 29 mmol/L    Glucose 94 70 - 110 mg/dL    BUN, Bld 31 (H) 6 - 20 mg/dL    Creatinine 0.8 0.5 - 1.4 mg/dL    Calcium 7.7 (L) 8.7 - 10.5 mg/dL    Anion Gap 8 8 - 16 mmol/L    eGFR if African American >60 >60 mL/min/1.73 m^2    eGFR if non African American >60 >60 mL/min/1.73 m^2   Magnesium   Result Value Ref Range    Magnesium 1.9 1.6 - 2.6 mg/dL   Phosphorus   Result Value Ref Range    Phosphorus 2.6 (L) 2.7 - 4.5 mg/dL   CBC auto differential   Result Value Ref Range    WBC 7.35 3.90 - 12.70 K/uL    RBC 3.21 (L) 4.60 - 6.20 M/uL    Hemoglobin 9.3 (L) 14.0 - 18.0 g/dL    Hematocrit 28.3 (L) 40.0 - 54.0 %    Mean Corpuscular Volume 88 82 - 98 fL    Mean Corpuscular Hemoglobin 29.0 27.0 - 31.0 pg    Mean Corpuscular Hemoglobin Conc 32.9 32.0 - 36.0 g/dL    RDW 12.6 11.5 - 14.5 %    Platelets 184 150 - 350 K/uL    MPV 11.0 9.2 - 12.9 fL    Immature Granulocytes 0.5 0.0 - 0.5 %    Gran # (ANC) 4.8 1.8 - 7.7 K/uL    Immature Grans (Abs) 0.04 0.00 - 0.04 K/uL    Lymph # 1.9 1.0 - 4.8 K/uL    Mono # 0.6 0.3 - 1.0 K/uL     Eos # 0.0 0.0 - 0.5 K/uL    Baso # 0.02 0.00 - 0.20 K/uL    nRBC 0 0 /100 WBC    Gran% 65.0 38.0 - 73.0 %    Lymph% 26.4 18.0 - 48.0 %    Mono% 7.5 4.0 - 15.0 %    Eosinophil% 0.3 0.0 - 8.0 %    Basophil% 0.3 0.0 - 1.9 %    Poik Slight     Ovalocytes Occasional     Tear Drop Cells Occasional     Differential Method Automated    CBC auto differential   Result Value Ref Range    WBC 7.89 3.90 - 12.70 K/uL    RBC 3.16 (L) 4.60 - 6.20 M/uL    Hemoglobin 9.2 (L) 14.0 - 18.0 g/dL    Hematocrit 28.0 (L) 40.0 - 54.0 %    Mean Corpuscular Volume 89 82 - 98 fL    Mean Corpuscular Hemoglobin 29.1 27.0 - 31.0 pg    Mean Corpuscular Hemoglobin Conc 32.9 32.0 - 36.0 g/dL    RDW 12.8 11.5 - 14.5 %    Platelets 191 150 - 350 K/uL    MPV 10.5 9.2 - 12.9 fL    Immature Granulocytes 0.5 0.0 - 0.5 %    Gran # (ANC) 5.1 1.8 - 7.7 K/uL    Immature Grans (Abs) 0.04 0.00 - 0.04 K/uL    Lymph # 2.2 1.0 - 4.8 K/uL    Mono # 0.5 0.3 - 1.0 K/uL    Eos # 0.0 0.0 - 0.5 K/uL    Baso # 0.03 0.00 - 0.20 K/uL    nRBC 0 0 /100 WBC    Gran% 64.0 38.0 - 73.0 %    Lymph% 28.4 18.0 - 48.0 %    Mono% 6.3 4.0 - 15.0 %    Eosinophil% 0.4 0.0 - 8.0 %    Basophil% 0.4 0.0 - 1.9 %    Differential Method Automated    Basic metabolic panel   Result Value Ref Range    Sodium 141 136 - 145 mmol/L    Potassium 3.6 3.5 - 5.1 mmol/L    Chloride 109 95 - 110 mmol/L    CO2 23 23 - 29 mmol/L    Glucose 112 (H) 70 - 110 mg/dL    BUN, Bld 19 6 - 20 mg/dL    Creatinine 0.8 0.5 - 1.4 mg/dL    Calcium 8.0 (L) 8.7 - 10.5 mg/dL    Anion Gap 9 8 - 16 mmol/L    eGFR if African American >60 >60 mL/min/1.73 m^2    eGFR if non African American >60 >60 mL/min/1.73 m^2   CBC auto differential   Result Value Ref Range    WBC 8.81 3.90 - 12.70 K/uL    RBC 3.02 (L) 4.60 - 6.20 M/uL    Hemoglobin 8.7 (L) 14.0 - 18.0 g/dL    Hematocrit 26.7 (L) 40.0 - 54.0 %    Mean Corpuscular Volume 88 82 - 98 fL    Mean Corpuscular Hemoglobin 28.8 27.0 - 31.0 pg    Mean Corpuscular Hemoglobin Conc 32.6  32.0 - 36.0 g/dL    RDW 12.8 11.5 - 14.5 %    Platelets 184 150 - 350 K/uL    MPV 10.1 9.2 - 12.9 fL    Immature Granulocytes 0.5 0.0 - 0.5 %    Gran # (ANC) 6.0 1.8 - 7.7 K/uL    Immature Grans (Abs) 0.04 0.00 - 0.04 K/uL    Lymph # 2.0 1.0 - 4.8 K/uL    Mono # 0.7 0.3 - 1.0 K/uL    Eos # 0.1 0.0 - 0.5 K/uL    Baso # 0.03 0.00 - 0.20 K/uL    nRBC 0 0 /100 WBC    Gran% 68.5 38.0 - 73.0 %    Lymph% 22.2 18.0 - 48.0 %    Mono% 7.8 4.0 - 15.0 %    Eosinophil% 0.7 0.0 - 8.0 %    Basophil% 0.3 0.0 - 1.9 %    Differential Method Automated    Hemoglobin   Result Value Ref Range    Hemoglobin 8.5 (L) 14.0 - 18.0 g/dL   Hematocrit   Result Value Ref Range    Hematocrit 25.5 (L) 40.0 - 54.0 %   Magnesium   Result Value Ref Range    Magnesium 1.7 1.6 - 2.6 mg/dL   Phosphorus   Result Value Ref Range    Phosphorus 2.9 2.7 - 4.5 mg/dL   Basic metabolic panel   Result Value Ref Range    Sodium 139 136 - 145 mmol/L    Potassium 4.0 3.5 - 5.1 mmol/L    Chloride 107 95 - 110 mmol/L    CO2 25 23 - 29 mmol/L    Glucose 94 70 - 110 mg/dL    BUN, Bld 13 6 - 20 mg/dL    Creatinine 0.9 0.5 - 1.4 mg/dL    Calcium 8.1 (L) 8.7 - 10.5 mg/dL    Anion Gap 7 (L) 8 - 16 mmol/L    eGFR if African American >60 >60 mL/min/1.73 m^2    eGFR if non African American >60 >60 mL/min/1.73 m^2   Hemoglobin   Result Value Ref Range    Hemoglobin 8.4 (L) 14.0 - 18.0 g/dL   Hematocrit   Result Value Ref Range    Hematocrit 25.2 (L) 40.0 - 54.0 %   CBC auto differential   Result Value Ref Range    WBC 5.28 3.90 - 12.70 K/uL    RBC 2.83 (L) 4.60 - 6.20 M/uL    Hemoglobin 8.4 (L) 14.0 - 18.0 g/dL    Hematocrit 25.2 (L) 40.0 - 54.0 %    Mean Corpuscular Volume 91 82 - 98 fL    Mean Corpuscular Hemoglobin 29.7 27.0 - 31.0 pg    Mean Corpuscular Hemoglobin Conc 32.8 32.0 - 36.0 g/dL    RDW 13.1 11.5 - 14.5 %    Platelets 181 150 - 350 K/uL    MPV 10.9 9.2 - 12.9 fL    Immature Granulocytes 0.4 0.0 - 0.5 %    Gran # (ANC) 3.1 1.8 - 7.7 K/uL    Immature Grans (Abs)  0.02 0.00 - 0.04 K/uL    Lymph # 1.7 1.0 - 4.8 K/uL    Mono # 0.3 0.3 - 1.0 K/uL    Eos # 0.1 0.0 - 0.5 K/uL    Baso # 0.02 0.00 - 0.20 K/uL    nRBC 0 0 /100 WBC    Gran% 57.7 38.0 - 73.0 %    Lymph% 33.0 18.0 - 48.0 %    Mono% 6.4 4.0 - 15.0 %    Eosinophil% 2.1 0.0 - 8.0 %    Basophil% 0.4 0.0 - 1.9 %    Differential Method Automated    CBC auto differential   Result Value Ref Range    WBC 5.51 3.90 - 12.70 K/uL    RBC 3.02 (L) 4.60 - 6.20 M/uL    Hemoglobin 8.9 (L) 14.0 - 18.0 g/dL    Hematocrit 27.0 (L) 40.0 - 54.0 %    Mean Corpuscular Volume 89 82 - 98 fL    Mean Corpuscular Hemoglobin 29.5 27.0 - 31.0 pg    Mean Corpuscular Hemoglobin Conc 33.0 32.0 - 36.0 g/dL    RDW 12.9 11.5 - 14.5 %    Platelets 202 150 - 350 K/uL    MPV 10.0 9.2 - 12.9 fL    Immature Granulocytes 0.5 0.0 - 0.5 %    Gran # (ANC) 3.4 1.8 - 7.7 K/uL    Immature Grans (Abs) 0.03 0.00 - 0.04 K/uL    Lymph # 1.6 1.0 - 4.8 K/uL    Mono # 0.4 0.3 - 1.0 K/uL    Eos # 0.1 0.0 - 0.5 K/uL    Baso # 0.02 0.00 - 0.20 K/uL    nRBC 0 0 /100 WBC    Gran% 61.9 38.0 - 73.0 %    Lymph% 28.3 18.0 - 48.0 %    Mono% 7.1 4.0 - 15.0 %    Eosinophil% 1.8 0.0 - 8.0 %    Basophil% 0.4 0.0 - 1.9 %    Differential Method Automated    Magnesium   Result Value Ref Range    Magnesium 1.8 1.6 - 2.6 mg/dL   Phosphorus   Result Value Ref Range    Phosphorus 3.2 2.7 - 4.5 mg/dL   CBC auto differential   Result Value Ref Range    WBC 4.63 3.90 - 12.70 K/uL    RBC 2.98 (L) 4.60 - 6.20 M/uL    Hemoglobin 8.6 (L) 14.0 - 18.0 g/dL    Hematocrit 26.0 (L) 40.0 - 54.0 %    Mean Corpuscular Volume 87 82 - 98 fL    Mean Corpuscular Hemoglobin 28.9 27.0 - 31.0 pg    Mean Corpuscular Hemoglobin Conc 33.1 32.0 - 36.0 g/dL    RDW 12.8 11.5 - 14.5 %    Platelets 219 150 - 350 K/uL    MPV 10.9 9.2 - 12.9 fL    Immature Granulocytes 0.6 (H) 0.0 - 0.5 %    Gran # (ANC) 2.5 1.8 - 7.7 K/uL    Immature Grans (Abs) 0.03 0.00 - 0.04 K/uL    Lymph # 1.6 1.0 - 4.8 K/uL    Mono # 0.3 0.3 - 1.0 K/uL     Eos # 0.2 0.0 - 0.5 K/uL    Baso # 0.02 0.00 - 0.20 K/uL    nRBC 0 0 /100 WBC    Gran% 53.5 38.0 - 73.0 %    Lymph% 35.2 18.0 - 48.0 %    Mono% 7.1 4.0 - 15.0 %    Eosinophil% 3.2 0.0 - 8.0 %    Basophil% 0.4 0.0 - 1.9 %    Differential Method Automated    Basic metabolic panel   Result Value Ref Range    Sodium 140 136 - 145 mmol/L    Potassium 3.6 3.5 - 5.1 mmol/L    Chloride 107 95 - 110 mmol/L    CO2 25 23 - 29 mmol/L    Glucose 97 70 - 110 mg/dL    BUN, Bld 6 6 - 20 mg/dL    Creatinine 0.8 0.5 - 1.4 mg/dL    Calcium 8.3 (L) 8.7 - 10.5 mg/dL    Anion Gap 8 8 - 16 mmol/L    eGFR if African American >60 >60 mL/min/1.73 m^2    eGFR if non African American >60 >60 mL/min/1.73 m^2   CBC auto differential   Result Value Ref Range    WBC 5.36 3.90 - 12.70 K/uL    RBC 2.97 (L) 4.60 - 6.20 M/uL    Hemoglobin 8.8 (L) 14.0 - 18.0 g/dL    Hematocrit 26.4 (L) 40.0 - 54.0 %    Mean Corpuscular Volume 89 82 - 98 fL    Mean Corpuscular Hemoglobin 29.6 27.0 - 31.0 pg    Mean Corpuscular Hemoglobin Conc 33.3 32.0 - 36.0 g/dL    RDW 12.9 11.5 - 14.5 %    Platelets 223 150 - 350 K/uL    MPV 10.6 9.2 - 12.9 fL    Immature Granulocytes 0.9 (H) 0.0 - 0.5 %    Gran # (ANC) 3.2 1.8 - 7.7 K/uL    Immature Grans (Abs) 0.05 (H) 0.00 - 0.04 K/uL    Lymph # 1.5 1.0 - 4.8 K/uL    Mono # 0.4 0.3 - 1.0 K/uL    Eos # 0.2 0.0 - 0.5 K/uL    Baso # 0.02 0.00 - 0.20 K/uL    nRBC 0 0 /100 WBC    Gran% 59.9 38.0 - 73.0 %    Lymph% 27.6 18.0 - 48.0 %    Mono% 7.8 4.0 - 15.0 %    Eosinophil% 3.4 0.0 - 8.0 %    Basophil% 0.4 0.0 - 1.9 %    Differential Method Automated    Type & Screen   Result Value Ref Range    Group & Rh O POS     Indirect Jett NEG    Prepare RBC 2 Units; transfusion   Result Value Ref Range    UNIT NUMBER [id number]     Product Code B9263M70     DISPENSE STATUS TRANSFUSED     CODING SYSTEM [id number]     Unit Blood Type Code 5100     Unit Blood Type O POS     Unit Expiration [id number]     UNIT NUMBER [id number]     Product  Code T4539F29     DISPENSE STATUS TRANSFUSED     CODING SYSTEM [id number]     Unit Blood Type Code 5100     Unit Blood Type O POS     Unit Expiration [id number]          Imaging Results:  Imaging Results          CT Head Without Contrast (Final result)  Result time 05/12/20 07:07:37    Final result by Ronnie Patterson MD (05/12/20 07:07:37)                 Impression:      No acute abnormality.    All CT scans at this facility use dose modulation, iterative reconstruction, and/or weight based dosing when appropriate to reduce radiation dose to as low as reasonable achievable.      Electronically signed by: Ronnie Patterson MD  Date:    05/12/2020  Time:    07:07             Narrative:    EXAMINATION:  CT HEAD WITHOUT CONTRAST    CLINICAL HISTORY:  Syncope/fainting;    TECHNIQUE:  Low dose axial CT images obtained throughout the head without intravenous contrast. Sagittal and coronal reconstructions were performed.    All CT scans at this facility use dose modulation, iterative reconstruction, and/or weight based dosing when appropriate to reduce radiation dose to as low as reasonable achievable.    COMPARISON:  None.    FINDINGS:  Intracranial compartment:    The brain parenchyma appears normal. No parenchymal mass, hemorrhage, edema or major vascular distribution infarct.    Ventricles and sulci are normal in size for age without evidence of hydrocephalus.    No extra-axial blood or fluid collections.    Skull/extracranial contents (limited evaluation): No fracture.  Mild sinus mucosal thickening.  Small polyp or retention cyst right maxillary sinus.  Mastoid air cells and paranasal sinuses are essentially clear.                               RADIOLOGY REPORT (Final result)  Result time 05/13/20 16:52:37    Final result by Unknown User (05/13/20 16:52:37)                                 1:11 AM: Per [facility] radiology, pt's head CT results: no acute intracranial process.    The EKG was ordered, reviewed, and independently  interpreted by the ED provider.  Interpretation time: 2128  Rate: 125 BPM  Rhythm: sinus tachycardia  Interpretation: No acute ST changes. No STEMI.           The Emergency Provider reviewed the vital signs and test results, which are outlined above.     ED Discussion     11:00 PM: Dr. Junior discussed the pt's case with Dr. Shetty (Gastroenterology) who recommends PPI BID, keep pt NPO, and admit to hospital medicine.    11:02 PM: Re-evaluated pt. I have discussed test results, shared treatment plan, and the need for admission with patient. Pt expresses understanding at this time and agree with all information. All questions answered. Pt has no further questions or concerns at this time. Pt is ready for admit.    1:12 AM: Discussed case with HARLEY Multani (Hospital Medicine). She agrees with current care and management of pt and accepts admission.   Admitting Service: Hospital medicine   Admitting Physician: Dr. Bell  Admit to: Obs Med Tele         Medical Decision Making:   Clinical Tests:   Lab Tests: Ordered and Reviewed  Radiological Study: Ordered and Reviewed  Medical Tests: Ordered and Reviewed           ED Medication(s):  Medications   sodium chloride 0.9% bolus 1,000 mL (0 mLs Intravenous Stopped 5/11/20 2236)   iron sucrose injection 200 mg (200 mg Intravenous Given 5/14/20 0836)       Discharge Medication List as of 5/14/2020 12:17 PM      START taking these medications    Details   pantoprazole (PROTONIX) 40 MG tablet Take 1 tablet (40 mg total) by mouth 2 (two) times daily., Starting Thu 5/14/2020, Until Sat 6/13/2020, Normal      sucralfate (CARAFATE) 1 gram tablet Take 1 tablet (1 g total) by mouth 4 (four) times daily before meals and nightly. for 7 days, Starting Thu 5/14/2020, Until Thu 5/21/2020, Normal             Follow-up Information     Aaron Mack PA-C. Schedule an appointment as soon as possible for a visit in 2 weeks.    Specialty:  Gastroenterology  Why:  Discharge follow up on  gastric ulcer   Contact information:  [id number]  [place] [address]  [phone]                       Scribe Attestation:   Scribe #1: I performed the above scribed service and the documentation accurately describes the services I performed. I attest to the accuracy of the note.     Attending:   Physician Attestation Statement for Scribe #1: I, Lori Junior MD, personally performed the services described in this documentation, as scribed by Andria Benz, in my presence, and it is both accurate and complete.           Clinical Impression       ICD-10-CM ICD-9-CM   1. Gastrointestinal hemorrhage associated with gastric ulcer K25.4 531.40   2. GI bleed K92.2 578.9   3. Upper GI bleed K92.2 578.9   4. Syncope, unspecified syncope type R55 780.2   5. Acute blood loss anemia D62 285.1       Disposition:   Disposition: Placed in Observation  Condition: Fair         Lori Junior MD  05/15/20 3857

## 2020-05-12 NOTE — HPI
Lorne Cartwright is a 44 year old male with no past medical history who presented to the ED with reports of a 1 week history of epigastric abdominal pain, nausea, vomiting and black stools. The patient states that his epigastric abdominal pain is worse after eating and he feels that his food is not going down. He reports only intermittent emesis which is typically clear, but 2 days prior to presentation, he noted a small amount of bright red blood in his emesis. The patient denies diarrhea, but reports intermittent black stools with occasional red clots. While in the ED, Nursing reported a small, similar appearing and foul smelling bowel movement. The patient reports two episodes of syncope one two days prior to presentation and another just prior to presentation. He believes these were due to decreased intake. The patient denies a prior history of GI bleeding, but reports taking nadir seltzer as often as every 4 hours on a regular basis. He denies taking other over the counter medications. In the ED, the patient was found to have a hemoglobin of 7.9 with no prior available for comparison. Orthostatic blood pressure was negative, but the patient was found to have postural tachycardia.

## 2020-05-12 NOTE — ANESTHESIA PREPROCEDURE EVALUATION
05/12/2020  Lorne Cartwright is a 44 y.o., male.    Anesthesia Evaluation    I have reviewed the Patient Summary Reports.    I have reviewed the Nursing Notes.   I have reviewed the Medications.     Review of Systems  Anesthesia Hx:  No problems with previous Anesthesia Denies Hx of Anesthetic complications  Denies Family Hx of Anesthesia complications.   Denies Personal Hx of Anesthesia complications.   Social:  Smoker, No Alcohol Use    Hematology/Oncology:     Oncology Normal    -- Anemia:   EENT/Dental:EENT/Dental Normal   Cardiovascular:  Cardiovascular Normal  ECG has been reviewed.    Pulmonary:  Pulmonary Normal    Renal/:  Renal/ Normal     Hepatic/GI:  Hepatic/GI Normal    Musculoskeletal:  Musculoskeletal Normal    Neurological:  Neurology Normal    Endocrine:  Endocrine Normal    Dermatological:  Skin Normal    Psych:  Psychiatric Normal           Physical Exam  General:  Well nourished    Airway/Jaw/Neck:  Airway Findings: Mouth Opening: Normal Tongue: Normal  General Airway Assessment: Adult  Mallampati: II  TM Distance: Normal, at least 6 cm      Dental:  Denies loose teeth             Anesthesia Plan  Type of Anesthesia, risks & benefits discussed:  Anesthesia Type:  MAC  Patient's Preference:   Intra-op Monitoring Plan: standard ASA monitors  Intra-op Monitoring Plan Comments:   Post Op Pain Control Plan:   Post Op Pain Control Plan Comments:   Induction:   IV  Beta Blocker:  Patient is not currently on a Beta-Blocker (No further documentation required).       Informed Consent: Patient understands risks and agrees with Anesthesia plan.  Questions answered. Anesthesia consent signed with patient.  ASA Score: 2     Day of Surgery Review of History & Physical: I have interviewed and examined the patient. I have reviewed the patient's H&P dated:    H&P update referred to the  provider.         Ready For Surgery From Anesthesia Perspective.

## 2020-05-13 PROBLEM — K25.4 GASTROINTESTINAL HEMORRHAGE ASSOCIATED WITH GASTRIC ULCER: Status: ACTIVE | Noted: 2020-05-11

## 2020-05-13 LAB
ANION GAP SERPL CALC-SCNC: 7 MMOL/L (ref 8–16)
BASOPHILS # BLD AUTO: 0.02 K/UL (ref 0–0.2)
BASOPHILS # BLD AUTO: 0.02 K/UL (ref 0–0.2)
BASOPHILS NFR BLD: 0.4 % (ref 0–1.9)
BASOPHILS NFR BLD: 0.4 % (ref 0–1.9)
BUN SERPL-MCNC: 13 MG/DL (ref 6–20)
CALCIUM SERPL-MCNC: 8.1 MG/DL (ref 8.7–10.5)
CHLORIDE SERPL-SCNC: 107 MMOL/L (ref 95–110)
CO2 SERPL-SCNC: 25 MMOL/L (ref 23–29)
CREAT SERPL-MCNC: 0.9 MG/DL (ref 0.5–1.4)
DIFFERENTIAL METHOD: ABNORMAL
DIFFERENTIAL METHOD: ABNORMAL
EOSINOPHIL # BLD AUTO: 0.1 K/UL (ref 0–0.5)
EOSINOPHIL # BLD AUTO: 0.1 K/UL (ref 0–0.5)
EOSINOPHIL NFR BLD: 1.8 % (ref 0–8)
EOSINOPHIL NFR BLD: 2.1 % (ref 0–8)
ERYTHROCYTE [DISTWIDTH] IN BLOOD BY AUTOMATED COUNT: 12.9 % (ref 11.5–14.5)
ERYTHROCYTE [DISTWIDTH] IN BLOOD BY AUTOMATED COUNT: 13.1 % (ref 11.5–14.5)
EST. GFR  (AFRICAN AMERICAN): >60 ML/MIN/1.73 M^2
EST. GFR  (NON AFRICAN AMERICAN): >60 ML/MIN/1.73 M^2
GLUCOSE SERPL-MCNC: 94 MG/DL (ref 70–110)
HCT VFR BLD AUTO: 25.2 % (ref 40–54)
HCT VFR BLD AUTO: 25.2 % (ref 40–54)
HCT VFR BLD AUTO: 27 % (ref 40–54)
HGB BLD-MCNC: 8.4 G/DL (ref 14–18)
HGB BLD-MCNC: 8.4 G/DL (ref 14–18)
HGB BLD-MCNC: 8.9 G/DL (ref 14–18)
IMM GRANULOCYTES # BLD AUTO: 0.02 K/UL (ref 0–0.04)
IMM GRANULOCYTES # BLD AUTO: 0.03 K/UL (ref 0–0.04)
IMM GRANULOCYTES NFR BLD AUTO: 0.4 % (ref 0–0.5)
IMM GRANULOCYTES NFR BLD AUTO: 0.5 % (ref 0–0.5)
LYMPHOCYTES # BLD AUTO: 1.6 K/UL (ref 1–4.8)
LYMPHOCYTES # BLD AUTO: 1.7 K/UL (ref 1–4.8)
LYMPHOCYTES NFR BLD: 28.3 % (ref 18–48)
LYMPHOCYTES NFR BLD: 33 % (ref 18–48)
MAGNESIUM SERPL-MCNC: 1.7 MG/DL (ref 1.6–2.6)
MCH RBC QN AUTO: 29.5 PG (ref 27–31)
MCH RBC QN AUTO: 29.7 PG (ref 27–31)
MCHC RBC AUTO-ENTMCNC: 32.8 G/DL (ref 32–36)
MCHC RBC AUTO-ENTMCNC: 33 G/DL (ref 32–36)
MCV RBC AUTO: 89 FL (ref 82–98)
MCV RBC AUTO: 91 FL (ref 82–98)
MONOCYTES # BLD AUTO: 0.3 K/UL (ref 0.3–1)
MONOCYTES # BLD AUTO: 0.4 K/UL (ref 0.3–1)
MONOCYTES NFR BLD: 6.4 % (ref 4–15)
MONOCYTES NFR BLD: 7.1 % (ref 4–15)
NEUTROPHILS # BLD AUTO: 3.1 K/UL (ref 1.8–7.7)
NEUTROPHILS # BLD AUTO: 3.4 K/UL (ref 1.8–7.7)
NEUTROPHILS NFR BLD: 57.7 % (ref 38–73)
NEUTROPHILS NFR BLD: 61.9 % (ref 38–73)
NRBC BLD-RTO: 0 /100 WBC
NRBC BLD-RTO: 0 /100 WBC
PHOSPHATE SERPL-MCNC: 2.9 MG/DL (ref 2.7–4.5)
PLATELET # BLD AUTO: 181 K/UL (ref 150–350)
PLATELET # BLD AUTO: 202 K/UL (ref 150–350)
PMV BLD AUTO: 10 FL (ref 9.2–12.9)
PMV BLD AUTO: 10.9 FL (ref 9.2–12.9)
POTASSIUM SERPL-SCNC: 4 MMOL/L (ref 3.5–5.1)
RBC # BLD AUTO: 2.83 M/UL (ref 4.6–6.2)
RBC # BLD AUTO: 3.02 M/UL (ref 4.6–6.2)
SODIUM SERPL-SCNC: 139 MMOL/L (ref 136–145)
WBC # BLD AUTO: 5.28 K/UL (ref 3.9–12.7)
WBC # BLD AUTO: 5.51 K/UL (ref 3.9–12.7)

## 2020-05-13 PROCEDURE — 63600175 PHARM REV CODE 636 W HCPCS: Performed by: FAMILY MEDICINE

## 2020-05-13 PROCEDURE — C9113 INJ PANTOPRAZOLE SODIUM, VIA: HCPCS | Performed by: FAMILY MEDICINE

## 2020-05-13 PROCEDURE — 84100 ASSAY OF PHOSPHORUS: CPT

## 2020-05-13 PROCEDURE — 99231 PR SUBSEQUENT HOSPITAL CARE,LEVL I: ICD-10-PCS | Mod: ,,, | Performed by: SURGERY

## 2020-05-13 PROCEDURE — 85025 COMPLETE CBC W/AUTO DIFF WBC: CPT

## 2020-05-13 PROCEDURE — 99232 SBSQ HOSP IP/OBS MODERATE 35: CPT | Mod: ,,, | Performed by: PHYSICIAN ASSISTANT

## 2020-05-13 PROCEDURE — 21400001 HC TELEMETRY ROOM

## 2020-05-13 PROCEDURE — 63600175 PHARM REV CODE 636 W HCPCS: Performed by: INTERNAL MEDICINE

## 2020-05-13 PROCEDURE — 83735 ASSAY OF MAGNESIUM: CPT

## 2020-05-13 PROCEDURE — 99231 SBSQ HOSP IP/OBS SF/LOW 25: CPT | Mod: ,,, | Performed by: SURGERY

## 2020-05-13 PROCEDURE — 36415 COLL VENOUS BLD VENIPUNCTURE: CPT

## 2020-05-13 PROCEDURE — 25000003 PHARM REV CODE 250: Performed by: INTERNAL MEDICINE

## 2020-05-13 PROCEDURE — 99232 PR SUBSEQUENT HOSPITAL CARE,LEVL II: ICD-10-PCS | Mod: ,,, | Performed by: PHYSICIAN ASSISTANT

## 2020-05-13 PROCEDURE — 25000003 PHARM REV CODE 250: Performed by: FAMILY MEDICINE

## 2020-05-13 PROCEDURE — 94761 N-INVAS EAR/PLS OXIMETRY MLT: CPT

## 2020-05-13 PROCEDURE — 80048 BASIC METABOLIC PNL TOTAL CA: CPT

## 2020-05-13 RX ADMIN — DEXTROSE 8 MG/HR: 50 INJECTION, SOLUTION INTRAVENOUS at 05:05

## 2020-05-13 RX ADMIN — SUCRALFATE 1 G: 1 SUSPENSION ORAL at 11:05

## 2020-05-13 RX ADMIN — SUCRALFATE 1 G: 1 SUSPENSION ORAL at 05:05

## 2020-05-13 RX ADMIN — DEXTROSE 8 MG/HR: 50 INJECTION, SOLUTION INTRAVENOUS at 11:05

## 2020-05-13 RX ADMIN — DEXTROSE 8 MG/HR: 50 INJECTION, SOLUTION INTRAVENOUS at 03:05

## 2020-05-13 RX ADMIN — Medication 1 TABLET: at 09:05

## 2020-05-13 RX ADMIN — SODIUM CHLORIDE: 0.9 INJECTION, SOLUTION INTRAVENOUS at 08:05

## 2020-05-13 RX ADMIN — IRON SUCROSE 200 MG: 20 INJECTION, SOLUTION INTRAVENOUS at 09:05

## 2020-05-13 RX ADMIN — DEXTROSE 8 MG/HR: 50 INJECTION, SOLUTION INTRAVENOUS at 08:05

## 2020-05-13 NOTE — PLAN OF CARE
Fall precautions maintained, pt free from injuries/fall.  Repositions and ambulates independently.  Denies any bleeding, pain, nausea/vomiting.  IVF and IV protonix as prescribed.   NSR on the monitor, 8584  Pt advanced to soft diet, tolerating well.  POC and meds discussed with pt via , pt verbalized understanding.  Side rails x 2, bed locked and low, phone and call light w/in reach.  Chart check done. Will cont to monitor.

## 2020-05-13 NOTE — PLAN OF CARE
POC and meds reviewed with patient. HARLEY Schultz at bedside as well. MARLEN used, reference number- [id number]. Questions answered. No further needs at this time.

## 2020-05-13 NOTE — PLAN OF CARE
Met with patient.  Discharge questions translated to [place].Patient stated no to discharge needs (necesidads) Patient is independent. [place] [address] [facility] [address] Patient lives with Britney ( girma galeano)   Patient denies any post hospital needs or services at this time.  Patient did not have any questions ( aundrea jennifer).  . Transitional Care Folder, Discharge Planning Begins on Admission pamphlet, [facility] Bedside Delivery pamphlet, Advance Directive information given to patient along with the contact information given.  Also patient requested to contact his brother Fawad Garcia contacted @ [phone].          D/C Plan: home  PCP: none  Preferred Pharmacy: no usual home meds  Discharge transportation: pov with brother  [facility]: inactive  Pharmacy Bedside Delivery: will need to send to [facility] if patient assistance is required.           05/13/20 1543   Discharge Assessment   Assessment Type Discharge Planning Assessment   Confirmed/corrected address and phone number on facesheet? Yes   Assessment information obtained from? Patient;Caregiver;Medical Record   Expected Length of Stay (days)   (1-2 days after completing of IV rx)   Communicated expected length of stay with patient/caregiver no   Prior to hospitilization cognitive status: Alert/Oriented   Prior to hospitalization functional status: Independent   Current cognitive status: Alert/Oriented   Current Functional Status: Independent   Facility Arrived From: home   Lives With significant other  (Britney)   Able to Return to Prior Arrangements yes   Is patient able to care for self after discharge? Yes   Who are your caregiver(s) and their phone number(s)? Beatriz Cartwright ( brother ) [phone]   Patient's perception of discharge disposition home or selfcare   Readmission Within the Last 30 Days no previous admission in last 30 days   Patient currently being followed by outpatient case management? No   Patient currently  receives any other outside agency services? No   Equipment Currently Used at Home none   Do you have any problems affording any of your prescribed medications? TBD   Is the patient taking medications as prescribed?   (n/a)   Does the patient have transportation home? Yes   Transportation Anticipated family or friend will provide   Does the patient receive services at the [facility]? No   Discharge Plan A Home;Home with family   Discharge Plan B Home   DME Needed Upon Discharge  none   Patient/Family in Agreement with Plan yes

## 2020-05-13 NOTE — SUBJECTIVE & OBJECTIVE
Subjective:     Interval History:   No acure events overnight. S/p EGD with treatment of visible vessel. Hgb down a little overnight. Vitals stable. No reports of overt bleeding. He denies nausea, vomiting or abdominal pain. He has no complaints.     Review of Systems   Constitutional:        See Interval History for daily ROS.      Objective:     Vital Signs (Most Recent):  Temp: 96.4 °F (35.8 °C) (05/13/20 0705)  Pulse: 87 (05/13/20 0705)  Resp: 18 (05/13/20 0705)  BP: 122/60 (05/13/20 0705)  SpO2: 97 % (05/13/20 0705) Vital Signs (24h Range):  Temp:  [96.4 °F (35.8 °C)-99.2 °F (37.3 °C)] 96.4 °F (35.8 °C)  Pulse:  [70-89] 87  Resp:  [15-18] 18  SpO2:  [97 %-100 %] 97 %  BP: (122-153)/(60-93) 122/60     Weight: 81.6 kg (179 lb 14.3 oz) (05/12/20 0331)  Body mass index is 28.18 kg/m².      Intake/Output Summary (Last 24 hours) at 5/13/2020 0734  Last data filed at 5/13/2020 0705  Gross per 24 hour   Intake 1338.33 ml   Output 150 ml   Net 1188.33 ml       Lines/Drains/Airways     Airway                 Airway - Non-Surgical 05/12/20 1033 Nasal Cannula less than 1 day          Peripheral Intravenous Line                 Peripheral IV - Single Lumen 05/11/20 2142 20 G Right Antecubital 1 day                Physical Exam   Constitutional: He is oriented to person, place, and time. He appears well-developed and well-nourished. No distress.   HENT:   Head: Normocephalic and atraumatic.   Eyes: EOM are normal.   Cardiovascular: Normal rate and regular rhythm.   Pulmonary/Chest: Effort normal and breath sounds normal. No respiratory distress. He has no wheezes.   Abdominal: Soft. Bowel sounds are normal. He exhibits no distension. There is no tenderness.   Neurological: He is alert and oriented to person, place, and time. No cranial nerve deficit.   Skin: He is not diaphoretic.   Psychiatric: His behavior is normal.       Significant Labs:  CBC:   Recent Labs   Lab 05/12/20  0803 05/12/20  1525 05/12/20  1822  05/12/20  2253 05/13/20  0622   WBC 7.35 7.89 8.81  --   --    HGB 9.3* 9.2* 8.7* 8.5* 8.4*   HCT 28.3* 28.0* 26.7* 25.5* 25.2*    191 184  --   --          Significant Imaging:  Imaging results within the past 24 hours have been reviewed.

## 2020-05-13 NOTE — PROGRESS NOTES
[facility]  Gastroenterology  Progress Note    Patient Name: Lorne Cartwright  MRN: [id number]  Admission Date: 5/11/2020  Hospital Length of Stay: 1 days  Code Status: Full Code   Attending Provider: Jack Hubbard MD  Consulting Provider: Aaron Mack PA-C  Primary Care Physician: No primary care provider on file.  Principal Problem: Gastrointestinal hemorrhage associated with gastric ulcer      Subjective:     Interval History:   No acure events overnight. S/p EGD with treatment of visible vessel. Hgb down a little overnight. Vitals stable. No reports of overt bleeding. He denies nausea, vomiting or abdominal pain. He has no complaints.     Review of Systems   Constitutional:        See Interval History for daily ROS.      Objective:     Vital Signs (Most Recent):  Temp: 96.4 °F (35.8 °C) (05/13/20 0705)  Pulse: 87 (05/13/20 0705)  Resp: 18 (05/13/20 0705)  BP: 122/60 (05/13/20 0705)  SpO2: 97 % (05/13/20 0705) Vital Signs (24h Range):  Temp:  [96.4 °F (35.8 °C)-99.2 °F (37.3 °C)] 96.4 °F (35.8 °C)  Pulse:  [70-89] 87  Resp:  [15-18] 18  SpO2:  [97 %-100 %] 97 %  BP: (122-153)/(60-93) 122/60     Weight: 81.6 kg (179 lb 14.3 oz) (05/12/20 0331)  Body mass index is 28.18 kg/m².      Intake/Output Summary (Last 24 hours) at 5/13/2020 0734  Last data filed at 5/13/2020 0705  Gross per 24 hour   Intake 1338.33 ml   Output 150 ml   Net 1188.33 ml       Lines/Drains/Airways     Airway                 Airway - Non-Surgical 05/12/20 1033 Nasal Cannula less than 1 day          Peripheral Intravenous Line                 Peripheral IV - Single Lumen 05/11/20 2142 20 G Right Antecubital 1 day                Physical Exam   Constitutional: He is oriented to person, place, and time. He appears well-developed and well-nourished. No distress.   HENT:   Head: Normocephalic and atraumatic.   Eyes: EOM are normal.   Cardiovascular: Normal rate and regular rhythm.   Pulmonary/Chest: Effort normal and  breath sounds normal. No respiratory distress. He has no wheezes.   Abdominal: Soft. Bowel sounds are normal. He exhibits no distension. There is no tenderness.   Neurological: He is alert and oriented to person, place, and time. No cranial nerve deficit.   Skin: He is not diaphoretic.   Psychiatric: His behavior is normal.       Significant Labs:  CBC:   Recent Labs   Lab 05/12/20  0803 05/12/20  1525 05/12/20  1822 05/12/20  2253 05/13/20  0622   WBC 7.35 7.89 8.81  --   --    HGB 9.3* 9.2* 8.7* 8.5* 8.4*   HCT 28.3* 28.0* 26.7* 25.5* 25.2*    191 184  --   --          Significant Imaging:  Imaging results within the past 24 hours have been reviewed.    Assessment/Plan:     * Gastrointestinal hemorrhage associated with gastric ulcer  Patient is feeling well. Advance diet.   Continue IV PPI for a total of 72 hours.   Continue Carafate for a total of 7 days.     Acute blood loss anemia  Hgb down a little which would be expected. However, he seems asymptomatic. Continue to monitor and transfuse as indicated.         Thank you for your consult. I will sign off. Please contact us if you have any additional questions.or any signs of active bleeding.     Aaron Mack PA-C  Gastroenterology  [facility]

## 2020-05-13 NOTE — ASSESSMENT & PLAN NOTE
Patient is feeling well. Advance diet.   Continue IV PPI for a total of 72 hours.   Continue Carafate for a total of 7 days.

## 2020-05-13 NOTE — ASSESSMENT & PLAN NOTE
Status post  Endoscopy from the with a bleeding gastric ulcer  Intervened with epi injection and cautery  No further bleeding noted   Monitor H&H for further signs of bleeding  Agree with medical therapy  Follow-up biopsies and cause of ulceration  Will sign off at this time please call If patient rebleeds and GI feels that patient is not a candidate for any further intervention endoscopically will be available for surgical intervention if necessary

## 2020-05-13 NOTE — PROGRESS NOTES
[facility] -   General Surgery  Progress Note    Subjective:     History of Present Illness:  44-year-old male referred for gastric ulcer.  The patient presented with hematemesis and underwent EGD where he was found to have a gastric ulcer along the antrum which was bleeding.  The ulcer was injected and cauterized with no further bleeding.  Patient currently tolerating clear liquids with no abdominal pain.  Denies any prior episodes.  Endorses tobacco use and social alcohol use    Post-Op Info:  Procedure(s) (LRB):  EGD (ESOPHAGOGASTRODUODENOSCOPY) (N/A)   1 Day Post-Op     Interval History:  Tolerating diet, no abdominal pain, denies any further bowel movement    Medications:  Continuous Infusions:   sodium chloride 0.9% 20 mL/hr at 05/13/20 1219    pantoprazole 40 mg in dextrose 5 % 100 mL infusion (ready to mix system) 8 mg/hr (05/13/20 0820)     Scheduled Meds:   folic acid-vit B6-vit B12 2.5-25-2 mg  1 tablet Oral Daily    iron sucrose  200 mg Intravenous Daily    sucralfate  1 g Oral Q6H     PRN Meds:sodium chloride, acetaminophen, HYDROcodone-acetaminophen, ondansetron     Review of patient's allergies indicates:  No Known Allergies  Objective:     Vital Signs (Most Recent):  Temp: 99.2 °F (37.3 °C) (05/13/20 1238)  Pulse: 89 (05/13/20 1238)  Resp: 20 (05/13/20 1238)  BP: 136/75 (05/13/20 1238)  SpO2: 96 % (05/13/20 1238) Vital Signs (24h Range):  Temp:  [96.4 °F (35.8 °C)-99.2 °F (37.3 °C)] 99.2 °F (37.3 °C)  Pulse:  [62-89] 89  Resp:  [17-20] 20  SpO2:  [96 %-98 %] 96 %  BP: (122-153)/(60-89) 136/75     Weight: 81.6 kg (179 lb 14.3 oz)  Body mass index is 28.18 kg/m².    Intake/Output - Last 3 Shifts       05/11 0700 - 05/12 0659 05/12 0700 - 05/13 0659 05/13 0700 - 05/14 0659    P.O.   240    I.V. (mL/kg)  430 (5.3) 908.3 (11.1)    Blood 755.4      IV Piggyback 1000      Total Intake(mL/kg) 1755.4 (21.5) 430 (5.3) 1148.3 (14.1)    Other  150     Total Output  150     Net +1755.4 +280  +1148.3           Urine Occurrence  1 x           Physical Exam   Constitutional: He is oriented to person, place, and time. He appears well-developed and well-nourished.   HENT:   Head: Normocephalic and atraumatic.   Eyes: EOM are normal.   Neck: Normal range of motion. Neck supple.   Cardiovascular: Normal rate and regular rhythm.   Pulmonary/Chest: Effort normal and breath sounds normal.   Abdominal: Soft. Bowel sounds are normal. He exhibits no distension. There is no tenderness.   Neurological: He is alert and oriented to person, place, and time.   Skin: Skin is warm and dry.   Vitals reviewed.      Significant Labs:  CBC:   Recent Labs   Lab 05/13/20  0622   WBC 5.28   RBC 2.83*   HGB 8.4*  8.4*   HCT 25.2*  25.2*      MCV 91   MCH 29.7   MCHC 32.8     BMP:   Recent Labs   Lab 05/13/20  0622   GLU 94      K 4.0      CO2 25   BUN 13   CREATININE 0.9   CALCIUM 8.1*   MG 1.7       Significant Diagnostics:  I have reviewed all pertinent imaging results/findings within the past 24 hours.    Assessment/Plan:     * Gastrointestinal hemorrhage associated with gastric ulcer  Status post  Endoscopy from the with a bleeding gastric ulcer  Intervened with epi injection and cautery  No further bleeding noted   Monitor H&H for further signs of bleeding  Agree with medical therapy  Follow-up biopsies and cause of ulceration  Will sign off at this time please call If patient rebleeds and GI feels that patient is not a candidate for any further intervention endoscopically will be available for surgical intervention if necessary            Tomas Cheng MD  General Surgery  [facility]

## 2020-05-13 NOTE — PROGRESS NOTES
[facility] Hospital Medicine  Progress Note    Patient Name: Lorne Cartwright  MRN: [id number]  Patient Class: IP- Inpatient   Admission Date: 5/11/2020  Length of Stay: 1 days  Attending Physician: Jack Hubbard MD  Primary Care Provider: No primary care provider on file.        Subjective:     Principal Problem:Gastrointestinal hemorrhage associated with gastric ulcer        HPI:  Lorne Cartwright is a 44 year old male with no past medical history who presented to the ED with reports of a 1 week history of epigastric abdominal pain, nausea, vomiting and black stools. The patient states that his epigastric abdominal pain is worse after eating and he feels that his food is not going down. He reports only intermittent emesis which is typically clear, but 2 days prior to presentation, he noted a small amount of bright red blood in his emesis. The patient denies diarrhea, but reports intermittent black stools with occasional red clots. While in the ED, Nursing reported a small, similar appearing and foul smelling bowel movement. The patient reports two episodes of syncope one two days prior to presentation and another just prior to presentation. He believes these were due to decreased intake. The patient denies a prior history of GI bleeding, but reports taking nadir seltzer as often as every 4 hours on a regular basis. He denies taking other over the counter medications. In the ED, the patient was found to have a hemoglobin of 7.9 with no prior available for comparison. Orthostatic blood pressure was negative, but the patient was found to have postural tachycardia.     Overview/Hospital Course:  5/12- Vitals are stable. S/P 1 unit of P-RBC transfusion with appropriate response . Hb 7.9> 9.3> 9.1. GI consult obtained and will have EGD today .Monitor H/H . Monitor Vitals . Continue PPI drip .     5/13- S/P EGD yesterday and noted to have Normal esophagus and duodenum and large  excavated oozing gastric ulcer with a visible vessel treated with bipolar cautery and biopsied. General Surgery consult obtained and plan is noted. Continue IV PPI ggt for 72h per GI recommendation. H/H are fairly stable with a slight drop since EGD. Hb 9.2> 8.7>8.5. Vitals are stable . Pt denies abd pain , denies any vomiting or black stool or blood in the stool.                             Interval History:     S/P EGD yesterday and noted to have Normal esophagus and duodenum and large excavated oozing gastric ulcer with a visible vessel treated with bipolar cautery and biopsied. General Surgery consult obtained and plan is noted. Continue IV PPI ggt for 72h per GI recommendation. H/H are fairly stable with a slight drop since EGD. Hb 9.2> 8.7>8.5. Vitals are stable . Pt denies abd pain , denies any vomiting or black stool or blood in the stool.     Review of Systems   Constitutional: Negative for activity change, appetite change and fever.   HENT: Negative for sore throat.    Eyes: Negative for visual disturbance.   Respiratory: Negative for cough, chest tightness and shortness of breath.    Cardiovascular: Negative for chest pain, palpitations and leg swelling.   Gastrointestinal: Negative for abdominal distention, abdominal pain, constipation, diarrhea, nausea and vomiting.   Endocrine: Negative for polyuria.   Genitourinary: Negative for decreased urine volume, dysuria, flank pain, frequency and hematuria.   Musculoskeletal: Negative for back pain and gait problem.   Skin: Negative for rash.   Neurological: Negative for syncope, speech difficulty, weakness, light-headedness and headaches.   Psychiatric/Behavioral: Negative for confusion, hallucinations and sleep disturbance.     Objective:     Vital Signs (Most Recent):  Temp: 96.4 °F (35.8 °C) (05/13/20 0705)  Pulse: 76 (05/13/20 1130)  Resp: 17 (05/13/20 0830)  BP: 122/60 (05/13/20 0705)  SpO2: 98 % (05/13/20 0830) Vital Signs (24h Range):  Temp:  [96.4 °F  (35.8 °C)-99.2 °F (37.3 °C)] 96.4 °F (35.8 °C)  Pulse:  [62-87] 76  Resp:  [17-18] 17  SpO2:  [97 %-98 %] 98 %  BP: (122-153)/(60-89) 122/60     Weight: 81.6 kg (179 lb 14.3 oz)  Body mass index is 28.18 kg/m².    Intake/Output Summary (Last 24 hours) at 5/13/2020 1214  Last data filed at 5/13/2020 0820  Gross per 24 hour   Intake 1148.33 ml   Output --   Net 1148.33 ml      Physical Exam   Constitutional: He is oriented to person, place, and time. He appears well-developed and well-nourished. No distress.   HENT:   Head: Normocephalic and atraumatic.   Mouth/Throat: No oropharyngeal exudate.   Eyes: Pupils are equal, round, and reactive to light. Conjunctivae and EOM are normal.   Neck: Normal range of motion. Neck supple. No JVD present. No thyromegaly present.   Cardiovascular: Normal rate, regular rhythm and normal heart sounds.   No murmur heard.  Pulmonary/Chest: Effort normal and breath sounds normal. No respiratory distress. He has no wheezes. He has no rales. He exhibits no tenderness.   Abdominal: Soft. Bowel sounds are normal. He exhibits no distension. There is no tenderness. There is no rebound and no guarding.   Musculoskeletal: Normal range of motion. He exhibits no edema.   Lymphadenopathy:     He has no cervical adenopathy.   Neurological: He is alert and oriented to person, place, and time. He displays normal reflexes. No cranial nerve deficit or sensory deficit.   Skin: Skin is warm and dry. No rash noted. He is not diaphoretic.   Psychiatric: He has a normal mood and affect.       Significant Labs:   CBC:   Recent Labs   Lab 05/12/20  1525 05/12/20  1822 05/12/20  2253 05/13/20  0622   WBC 7.89 8.81  --  5.28   HGB 9.2* 8.7* 8.5* 8.4*  8.4*   HCT 28.0* 26.7* 25.5* 25.2*  25.2*    184  --  181     CMP:   Recent Labs   Lab 05/11/20  2150 05/12/20  0611 05/12/20  1525 05/13/20  0622    140 141 139   K 3.8 3.9 3.6 4.0    108 109 107   CO2 26 24 23 25    94 112* 94   BUN  30* 31* 19 13   CREATININE 0.9 0.8 0.8 0.9   CALCIUM 8.4* 7.7* 8.0* 8.1*   PROT 5.4*  --   --   --    ALBUMIN 2.9*  --   --   --    BILITOT 0.1  --   --   --    ALKPHOS 46*  --   --   --    AST 19  --   --   --    ALT 15  --   --   --    ANIONGAP 9 8 9 7*   EGFRNONAA >60 >60 >60 >60       Significant Imaging:       Assessment/Plan:      * Gastrointestinal hemorrhage associated with gastric ulcer  -Possibly due to nadir seltzer abuse.   -IV Protonix.   -Monitor hemoglobin and hematocrit.   -Transfuse as needed.   -GI consult.   5/12- S/P 1 unit of P-RBC transfusion . EGD today . Continue PPI infusion   5/13- EGD resulted Normal esophagus and duodenum and large excavated oozing gastric ulcer with a visible vessel treated with bipolar cautery and biopsied. Continue IV  PPI ggt x 72h , oral liquid Carafate .     Acute blood loss anemia  -Monitor hemoglobin and hematocrit.   -Transfuse as needed.   -Fairly stable post transfusion x 1 unit  and post procedure       Syncope  -Most likely due to acute volume loss associated with GI bleeding.   -Monitor.   5/12- S/P 1 unit of P-RBC transfusion . Vitals are stable         VTE Risk Mitigation (From admission, onward)         Ordered     IP VTE HIGH RISK PATIENT  Once      05/11/20 2332     Place sequential compression device  Until discontinued      05/11/20 2332                      Jack Hubbard MD  Department of Hospital Medicine   [facility]

## 2020-05-13 NOTE — PLAN OF CARE
Updated pt with POC and reviewed meds via MARLEN, reference number [id number]. Questions answered. Pt verbalized understanding.

## 2020-05-13 NOTE — SUBJECTIVE & OBJECTIVE
Interval History:     S/P EGD yesterday and noted to have Normal esophagus and duodenum and large excavated oozing gastric ulcer with a visible vessel treated with bipolar cautery and biopsied. General Surgery consult obtained and plan is noted. Continue IV PPI ggt for 72h per GI recommendation. H/H are fairly stable with a slight drop since EGD. Hb 9.2> 8.7>8.5. Vitals are stable . Pt denies abd pain , denies any vomiting or black stool or blood in the stool.     Review of Systems   Constitutional: Negative for activity change, appetite change and fever.   HENT: Negative for sore throat.    Eyes: Negative for visual disturbance.   Respiratory: Negative for cough, chest tightness and shortness of breath.    Cardiovascular: Negative for chest pain, palpitations and leg swelling.   Gastrointestinal: Negative for abdominal distention, abdominal pain, constipation, diarrhea, nausea and vomiting.   Endocrine: Negative for polyuria.   Genitourinary: Negative for decreased urine volume, dysuria, flank pain, frequency and hematuria.   Musculoskeletal: Negative for back pain and gait problem.   Skin: Negative for rash.   Neurological: Negative for syncope, speech difficulty, weakness, light-headedness and headaches.   Psychiatric/Behavioral: Negative for confusion, hallucinations and sleep disturbance.     Objective:     Vital Signs (Most Recent):  Temp: 96.4 °F (35.8 °C) (05/13/20 0705)  Pulse: 76 (05/13/20 1130)  Resp: 17 (05/13/20 0830)  BP: 122/60 (05/13/20 0705)  SpO2: 98 % (05/13/20 0830) Vital Signs (24h Range):  Temp:  [96.4 °F (35.8 °C)-99.2 °F (37.3 °C)] 96.4 °F (35.8 °C)  Pulse:  [62-87] 76  Resp:  [17-18] 17  SpO2:  [97 %-98 %] 98 %  BP: (122-153)/(60-89) 122/60     Weight: 81.6 kg (179 lb 14.3 oz)  Body mass index is 28.18 kg/m².    Intake/Output Summary (Last 24 hours) at 5/13/2020 1214  Last data filed at 5/13/2020 0820  Gross per 24 hour   Intake 1148.33 ml   Output --   Net 1148.33 ml      Physical Exam    Constitutional: He is oriented to person, place, and time. He appears well-developed and well-nourished. No distress.   HENT:   Head: Normocephalic and atraumatic.   Mouth/Throat: No oropharyngeal exudate.   Eyes: Pupils are equal, round, and reactive to light. Conjunctivae and EOM are normal.   Neck: Normal range of motion. Neck supple. No JVD present. No thyromegaly present.   Cardiovascular: Normal rate, regular rhythm and normal heart sounds.   No murmur heard.  Pulmonary/Chest: Effort normal and breath sounds normal. No respiratory distress. He has no wheezes. He has no rales. He exhibits no tenderness.   Abdominal: Soft. Bowel sounds are normal. He exhibits no distension. There is no tenderness. There is no rebound and no guarding.   Musculoskeletal: Normal range of motion. He exhibits no edema.   Lymphadenopathy:     He has no cervical adenopathy.   Neurological: He is alert and oriented to person, place, and time. He displays normal reflexes. No cranial nerve deficit or sensory deficit.   Skin: Skin is warm and dry. No rash noted. He is not diaphoretic.   Psychiatric: He has a normal mood and affect.       Significant Labs:   CBC:   Recent Labs   Lab 05/12/20  1525 05/12/20  1822 05/12/20  2253 05/13/20  0622   WBC 7.89 8.81  --  5.28   HGB 9.2* 8.7* 8.5* 8.4*  8.4*   HCT 28.0* 26.7* 25.5* 25.2*  25.2*    184  --  181     CMP:   Recent Labs   Lab 05/11/20  2150 05/12/20  0611 05/12/20  1525 05/13/20  0622    140 141 139   K 3.8 3.9 3.6 4.0    108 109 107   CO2 26 24 23 25    94 112* 94   BUN 30* 31* 19 13   CREATININE 0.9 0.8 0.8 0.9   CALCIUM 8.4* 7.7* 8.0* 8.1*   PROT 5.4*  --   --   --    ALBUMIN 2.9*  --   --   --    BILITOT 0.1  --   --   --    ALKPHOS 46*  --   --   --    AST 19  --   --   --    ALT 15  --   --   --    ANIONGAP 9 8 9 7*   EGFRNONAA >60 >60 >60 >60       Significant Imaging:

## 2020-05-13 NOTE — ASSESSMENT & PLAN NOTE
Hgb down a little which would be expected. However, he seems asymptomatic. Continue to monitor and transfuse as indicated.

## 2020-05-13 NOTE — ASSESSMENT & PLAN NOTE
-Possibly due to nadir seltzer abuse.   -IV Protonix.   -Monitor hemoglobin and hematocrit.   -Transfuse as needed.   -GI consult.   5/12- S/P 1 unit of P-RBC transfusion . EGD today . Continue PPI infusion   5/13- EGD resulted Normal esophagus and duodenum and large excavated oozing gastric ulcer with a visible vessel treated with bipolar cautery and biopsied. Continue IV  PPI ggt x 72h , oral liquid Carafate .

## 2020-05-13 NOTE — SUBJECTIVE & OBJECTIVE
Interval History:  Tolerating diet, no abdominal pain, denies any further bowel movement    Medications:  Continuous Infusions:   sodium chloride 0.9% 20 mL/hr at 05/13/20 1219    pantoprazole 40 mg in dextrose 5 % 100 mL infusion (ready to mix system) 8 mg/hr (05/13/20 0820)     Scheduled Meds:   folic acid-vit B6-vit B12 2.5-25-2 mg  1 tablet Oral Daily    iron sucrose  200 mg Intravenous Daily    sucralfate  1 g Oral Q6H     PRN Meds:sodium chloride, acetaminophen, HYDROcodone-acetaminophen, ondansetron     Review of patient's allergies indicates:  No Known Allergies  Objective:     Vital Signs (Most Recent):  Temp: 99.2 °F (37.3 °C) (05/13/20 1238)  Pulse: 89 (05/13/20 1238)  Resp: 20 (05/13/20 1238)  BP: 136/75 (05/13/20 1238)  SpO2: 96 % (05/13/20 1238) Vital Signs (24h Range):  Temp:  [96.4 °F (35.8 °C)-99.2 °F (37.3 °C)] 99.2 °F (37.3 °C)  Pulse:  [62-89] 89  Resp:  [17-20] 20  SpO2:  [96 %-98 %] 96 %  BP: (122-153)/(60-89) 136/75     Weight: 81.6 kg (179 lb 14.3 oz)  Body mass index is 28.18 kg/m².    Intake/Output - Last 3 Shifts       05/11 0700 - 05/12 0659 05/12 0700 - 05/13 0659 05/13 0700 - 05/14 0659    P.O.   240    I.V. (mL/kg)  430 (5.3) 908.3 (11.1)    Blood 755.4      IV Piggyback 1000      Total Intake(mL/kg) 1755.4 (21.5) 430 (5.3) 1148.3 (14.1)    Other  150     Total Output  150     Net +1755.4 +280 +1148.3           Urine Occurrence  1 x           Physical Exam   Constitutional: He is oriented to person, place, and time. He appears well-developed and well-nourished.   HENT:   Head: Normocephalic and atraumatic.   Eyes: EOM are normal.   Neck: Normal range of motion. Neck supple.   Cardiovascular: Normal rate and regular rhythm.   Pulmonary/Chest: Effort normal and breath sounds normal.   Abdominal: Soft. Bowel sounds are normal. He exhibits no distension. There is no tenderness.   Neurological: He is alert and oriented to person, place, and time.   Skin: Skin is warm and dry.   Vitals  reviewed.      Significant Labs:  CBC:   Recent Labs   Lab 05/13/20 0622   WBC 5.28   RBC 2.83*   HGB 8.4*  8.4*   HCT 25.2*  25.2*      MCV 91   MCH 29.7   MCHC 32.8     BMP:   Recent Labs   Lab 05/13/20 0622   GLU 94      K 4.0      CO2 25   BUN 13   CREATININE 0.9   CALCIUM 8.1*   MG 1.7       Significant Diagnostics:  I have reviewed all pertinent imaging results/findings within the past 24 hours.

## 2020-05-13 NOTE — ASSESSMENT & PLAN NOTE
-Monitor hemoglobin and hematocrit.   -Transfuse as needed.   -Fairly stable post transfusion x 1 unit  and post procedure

## 2020-05-14 ENCOUNTER — TELEPHONE (OUTPATIENT)
Dept: GASTROENTEROLOGY | Facility: CLINIC | Age: 45
End: 2020-05-14

## 2020-05-14 VITALS
RESPIRATION RATE: 20 BRPM | HEART RATE: 84 BPM | HEIGHT: 67 IN | DIASTOLIC BLOOD PRESSURE: 63 MMHG | SYSTOLIC BLOOD PRESSURE: 128 MMHG | WEIGHT: 179.88 LBS | TEMPERATURE: 99 F | BODY MASS INDEX: 28.23 KG/M2 | OXYGEN SATURATION: 96 %

## 2020-05-14 PROBLEM — K25.4 GASTRIC ULCER WITH HEMORRHAGE: Status: RESOLVED | Noted: 2020-05-14 | Resolved: 2020-05-14

## 2020-05-14 PROBLEM — R55 SYNCOPE: Status: RESOLVED | Noted: 2020-05-11 | Resolved: 2020-05-14

## 2020-05-14 PROBLEM — K25.4 GASTRIC ULCER WITH HEMORRHAGE: Status: ACTIVE | Noted: 2020-05-14

## 2020-05-14 PROBLEM — K92.2 UPPER GI BLEED: Status: RESOLVED | Noted: 2020-05-11 | Resolved: 2020-05-14

## 2020-05-14 LAB
ANION GAP SERPL CALC-SCNC: 8 MMOL/L (ref 8–16)
BASOPHILS # BLD AUTO: 0.02 K/UL (ref 0–0.2)
BASOPHILS # BLD AUTO: 0.02 K/UL (ref 0–0.2)
BASOPHILS NFR BLD: 0.4 % (ref 0–1.9)
BASOPHILS NFR BLD: 0.4 % (ref 0–1.9)
BUN SERPL-MCNC: 6 MG/DL (ref 6–20)
CALCIUM SERPL-MCNC: 8.3 MG/DL (ref 8.7–10.5)
CHLORIDE SERPL-SCNC: 107 MMOL/L (ref 95–110)
CO2 SERPL-SCNC: 25 MMOL/L (ref 23–29)
CREAT SERPL-MCNC: 0.8 MG/DL (ref 0.5–1.4)
DIFFERENTIAL METHOD: ABNORMAL
DIFFERENTIAL METHOD: ABNORMAL
EOSINOPHIL # BLD AUTO: 0.2 K/UL (ref 0–0.5)
EOSINOPHIL # BLD AUTO: 0.2 K/UL (ref 0–0.5)
EOSINOPHIL NFR BLD: 3.2 % (ref 0–8)
EOSINOPHIL NFR BLD: 3.4 % (ref 0–8)
ERYTHROCYTE [DISTWIDTH] IN BLOOD BY AUTOMATED COUNT: 12.8 % (ref 11.5–14.5)
ERYTHROCYTE [DISTWIDTH] IN BLOOD BY AUTOMATED COUNT: 12.9 % (ref 11.5–14.5)
EST. GFR  (AFRICAN AMERICAN): >60 ML/MIN/1.73 M^2
EST. GFR  (NON AFRICAN AMERICAN): >60 ML/MIN/1.73 M^2
GLUCOSE SERPL-MCNC: 97 MG/DL (ref 70–110)
HCT VFR BLD AUTO: 26 % (ref 40–54)
HCT VFR BLD AUTO: 26.4 % (ref 40–54)
HGB BLD-MCNC: 8.6 G/DL (ref 14–18)
HGB BLD-MCNC: 8.8 G/DL (ref 14–18)
IMM GRANULOCYTES # BLD AUTO: 0.03 K/UL (ref 0–0.04)
IMM GRANULOCYTES # BLD AUTO: 0.05 K/UL (ref 0–0.04)
IMM GRANULOCYTES NFR BLD AUTO: 0.6 % (ref 0–0.5)
IMM GRANULOCYTES NFR BLD AUTO: 0.9 % (ref 0–0.5)
LYMPHOCYTES # BLD AUTO: 1.5 K/UL (ref 1–4.8)
LYMPHOCYTES # BLD AUTO: 1.6 K/UL (ref 1–4.8)
LYMPHOCYTES NFR BLD: 27.6 % (ref 18–48)
LYMPHOCYTES NFR BLD: 35.2 % (ref 18–48)
MAGNESIUM SERPL-MCNC: 1.8 MG/DL (ref 1.6–2.6)
MCH RBC QN AUTO: 28.9 PG (ref 27–31)
MCH RBC QN AUTO: 29.6 PG (ref 27–31)
MCHC RBC AUTO-ENTMCNC: 33.1 G/DL (ref 32–36)
MCHC RBC AUTO-ENTMCNC: 33.3 G/DL (ref 32–36)
MCV RBC AUTO: 87 FL (ref 82–98)
MCV RBC AUTO: 89 FL (ref 82–98)
MONOCYTES # BLD AUTO: 0.3 K/UL (ref 0.3–1)
MONOCYTES # BLD AUTO: 0.4 K/UL (ref 0.3–1)
MONOCYTES NFR BLD: 7.1 % (ref 4–15)
MONOCYTES NFR BLD: 7.8 % (ref 4–15)
NEUTROPHILS # BLD AUTO: 2.5 K/UL (ref 1.8–7.7)
NEUTROPHILS # BLD AUTO: 3.2 K/UL (ref 1.8–7.7)
NEUTROPHILS NFR BLD: 53.5 % (ref 38–73)
NEUTROPHILS NFR BLD: 59.9 % (ref 38–73)
NRBC BLD-RTO: 0 /100 WBC
NRBC BLD-RTO: 0 /100 WBC
PHOSPHATE SERPL-MCNC: 3.2 MG/DL (ref 2.7–4.5)
PLATELET # BLD AUTO: 219 K/UL (ref 150–350)
PLATELET # BLD AUTO: 223 K/UL (ref 150–350)
PMV BLD AUTO: 10.6 FL (ref 9.2–12.9)
PMV BLD AUTO: 10.9 FL (ref 9.2–12.9)
POTASSIUM SERPL-SCNC: 3.6 MMOL/L (ref 3.5–5.1)
RBC # BLD AUTO: 2.97 M/UL (ref 4.6–6.2)
RBC # BLD AUTO: 2.98 M/UL (ref 4.6–6.2)
SODIUM SERPL-SCNC: 140 MMOL/L (ref 136–145)
WBC # BLD AUTO: 4.63 K/UL (ref 3.9–12.7)
WBC # BLD AUTO: 5.36 K/UL (ref 3.9–12.7)

## 2020-05-14 PROCEDURE — 80048 BASIC METABOLIC PNL TOTAL CA: CPT

## 2020-05-14 PROCEDURE — 83735 ASSAY OF MAGNESIUM: CPT

## 2020-05-14 PROCEDURE — 84100 ASSAY OF PHOSPHORUS: CPT

## 2020-05-14 PROCEDURE — 36415 COLL VENOUS BLD VENIPUNCTURE: CPT

## 2020-05-14 PROCEDURE — 63600175 PHARM REV CODE 636 W HCPCS: Performed by: INTERNAL MEDICINE

## 2020-05-14 PROCEDURE — 63600175 PHARM REV CODE 636 W HCPCS: Performed by: FAMILY MEDICINE

## 2020-05-14 PROCEDURE — C9113 INJ PANTOPRAZOLE SODIUM, VIA: HCPCS | Performed by: FAMILY MEDICINE

## 2020-05-14 PROCEDURE — 85025 COMPLETE CBC W/AUTO DIFF WBC: CPT | Mod: 91

## 2020-05-14 PROCEDURE — 25000003 PHARM REV CODE 250: Performed by: INTERNAL MEDICINE

## 2020-05-14 RX ORDER — PANTOPRAZOLE SODIUM 40 MG/1
40 TABLET, DELAYED RELEASE ORAL 2 TIMES DAILY
Status: DISCONTINUED | OUTPATIENT
Start: 2020-05-14 | End: 2020-05-14 | Stop reason: HOSPADM

## 2020-05-14 RX ORDER — PANTOPRAZOLE SODIUM 40 MG/1
40 TABLET, DELAYED RELEASE ORAL DAILY
Status: DISCONTINUED | OUTPATIENT
Start: 2020-05-14 | End: 2020-05-14 | Stop reason: DRUGHIGH

## 2020-05-14 RX ORDER — PANTOPRAZOLE SODIUM 40 MG/1
40 TABLET, DELAYED RELEASE ORAL 2 TIMES DAILY
Qty: 60 TABLET | Refills: 0 | Status: SHIPPED | OUTPATIENT
Start: 2020-05-14 | End: 2020-06-13

## 2020-05-14 RX ORDER — SUCRALFATE 1 G/1
1 TABLET ORAL
Qty: 28 TABLET | Refills: 0 | Status: SHIPPED | OUTPATIENT
Start: 2020-05-14 | End: 2020-05-21

## 2020-05-14 RX ADMIN — SUCRALFATE 1 G: 1 SUSPENSION ORAL at 06:05

## 2020-05-14 RX ADMIN — Medication 1 TABLET: at 08:05

## 2020-05-14 RX ADMIN — PANTOPRAZOLE SODIUM 40 MG: 40 TABLET, DELAYED RELEASE ORAL at 11:05

## 2020-05-14 RX ADMIN — IRON SUCROSE 200 MG: 20 INJECTION, SOLUTION INTRAVENOUS at 08:05

## 2020-05-14 RX ADMIN — SUCRALFATE 1 G: 1 SUSPENSION ORAL at 11:05

## 2020-05-14 RX ADMIN — DEXTROSE 8 MG/HR: 50 INJECTION, SOLUTION INTRAVENOUS at 06:05

## 2020-05-14 NOTE — PLAN OF CARE
Fall prevention precautions maintained, remained free of falls. Ambulates independently. Call light and personal items within easy reach. Pt is Serbian speaking only, Jennifer in use for interpretation. Pt denies pain. 24 hour chart check completed. Will continue to monitor until oncoming shift report is given.

## 2020-05-14 NOTE — NURSING
Patient stable for discharge. IV and heart monitor removed. Martii used to explain discharge summary. Patient will use curbside delivery to get medication. Message Sent to [facility]'s staff regarding follow up appointment.

## 2020-05-14 NOTE — TELEPHONE ENCOUNTER
----- Message from Janette Ndiaye RN sent at 5/14/2020  1:56 PM CDT -----  Patient will need a hospital follow  Up appointment set for within 2 weeks.

## 2020-05-14 NOTE — TELEPHONE ENCOUNTER
"Called pt to schedule a hosp follow up appt. Pt kept saying "no speak English, only [place]se."  Asked if anyone is available that speaks English, he only said "no speak English."      "

## 2020-05-14 NOTE — DISCHARGE SUMMARY
[facility] Hospital Medicine  Discharge Summary      Patient Name: Lorne Cartwright  MRN: [id number]  Admission Date: 5/11/2020  Hospital Length of Stay: 2 days  Discharge Date and Time:  05/14/2020 4:55 PM  Attending Physician: No att. providers found   Discharging Provider: Jack Hubbard MD  Primary Care Provider: No primary care provider on file.      HPI:   Lorne Cartwright is a 44 year old male with no past medical history who presented to the ED with reports of a 1 week history of epigastric abdominal pain, nausea, vomiting and black stools. The patient states that his epigastric abdominal pain is worse after eating and he feels that his food is not going down. He reports only intermittent emesis which is typically clear, but 2 days prior to presentation, he noted a small amount of bright red blood in his emesis. The patient denies diarrhea, but reports intermittent black stools with occasional red clots. While in the ED, Nursing reported a small, similar appearing and foul smelling bowel movement. The patient reports two episodes of syncope one two days prior to presentation and another just prior to presentation. He believes these were due to decreased intake. The patient denies a prior history of GI bleeding, but reports taking nadir seltzer as often as every 4 hours on a regular basis. He denies taking other over the counter medications. In the ED, the patient was found to have a hemoglobin of 7.9 with no prior available for comparison. Orthostatic blood pressure was negative, but the patient was found to have postural tachycardia.     Procedure(s) (LRB):  EGD (ESOPHAGOGASTRODUODENOSCOPY) (N/A)      Hospital Course:   5/12- Vitals are stable. S/P 1 unit of P-RBC transfusion with appropriate response . Hb 7.9> 9.3> 9.1. GI consult obtained and will have EGD today .Monitor H/H . Monitor Vitals . Continue PPI drip .     5/13- S/P EGD yesterday and noted to have Normal  esophagus and duodenum and large excavated oozing gastric ulcer with a visible vessel treated with bipolar cautery and biopsied. General Surgery consult obtained and plan is noted. Continue IV PPI ggt for 72h per GI recommendation. H/H are fairly stable with a slight drop since EGD. Hb 9.2> 8.7>8.5. Vitals are stable . Pt denies abd pain , denies any vomiting or black stool or blood in the stool.     5/14- Pt remained stable with stable vitals and stable H/H. He was given Iron Sucrose 200 mg daily x 2 dose. He had received one unit of P-RBC on the day of admit. Completed 72h IV protonix ggt  and switch to oral PPI today . GI has cleared pt for discharge to home today and follow up in the clinic in 2 weeks. He will continue Protonix 40 mg bid x 1 mos then one po daily thereafter and Carafate 1 gram po 4x daily x 1 week. Advised not to take Aleve , Ibuprofen or Aspirin product.                              Consults:   Consults (From admission, onward)        Status Ordering Provider     Inpatient consult to Gastroenterology  Once     Provider:  Nitza Shetty MD    Completed ZACH PORTILLO          No new Assessment & Plan notes have been filed under this hospital service since the last note was generated.  Service: Hospital Medicine    Final Active Diagnoses:    Diagnosis Date Noted POA    Acute blood loss anemia [D62] 05/11/2020 Yes      Problems Resolved During this Admission:    Diagnosis Date Noted Date Resolved POA    PRINCIPAL PROBLEM:  Upper GI bleed [K92.2] 05/11/2020 05/14/2020 Yes    Syncope [R55] 05/11/2020 05/14/2020 Yes    Gastric ulcer with hemorrhage [K25.4] 05/14/2020 05/14/2020 Yes       Discharged Condition: stable    Disposition: Home or Self Care    Follow Up:  Follow-up Information     Aaron Mack PA-C. Schedule an appointment as soon as possible for a visit in 2 weeks.    Specialty:  Gastroenterology  Why:  Discharge follow up on gastric ulcer   Contact information:  [id number]  [facility] [address]  [phone]                 Patient Instructions:      Diet Adult Regular     Activity as tolerated       Significant Diagnostic Studies: Labs:   BMP:   Recent Labs   Lab 05/13/20  0622 05/14/20  0510   GLU 94 97    140   K 4.0 3.6    107   CO2 25 25   BUN 13 6   CREATININE 0.9 0.8   CALCIUM 8.1* 8.3*   MG 1.7 1.8   , CMP   Recent Labs   Lab 05/13/20  0622 05/14/20  0510    140   K 4.0 3.6    107   CO2 25 25   GLU 94 97   BUN 13 6   CREATININE 0.9 0.8   CALCIUM 8.1* 8.3*   ANIONGAP 7* 8   ESTGFRAFRICA >60 >60   EGFRNONAA >60 >60    and CBC   Recent Labs   Lab 05/13/20  1840 05/14/20  0510 05/14/20  0842   WBC 5.51 4.63 5.36   HGB 8.9* 8.6* 8.8*   HCT 27.0* 26.0* 26.4*    219 223       Pending Diagnostic Studies:     Procedure Component Value Units Date/Time    Specimen to Pathology, Surgery Gastrointestinal tract [[id number]] Collected:  05/12/20 1119    Order Status:  Sent Lab Status:  In process Updated:  05/13/20 0815         Medications:  Reconciled Home Medications:      Medication List      START taking these medications    pantoprazole 40 MG tablet  Commonly known as:  PROTONIX  [place] tamera tableta (40 mg en total) por vía oral 2 veces al día.  (Take 1 tablet (40 mg total) by mouth 2 (two) times daily.)     sucralfate 1 gram tablet  Commonly known as:  CARAFATE  Take 1 tablet (1 g total) by mouth 4 (four) times daily before meals and nightly. for 7 days            Indwelling Lines/Drains at time of discharge:   Lines/Drains/Airways     Airway                 Airway - Non-Surgical 05/12/20 1033 Nasal Cannula 2 days                Time spent on the discharge of patient:  40  minutes  Patient was seen and examined on the date of discharge and determined to be suitable for discharge.         Jack Hubbard MD  Department of Hospital Medicine  [facility]

## 2020-05-14 NOTE — PLAN OF CARE
05/14/20 1538   Final Note   Assessment Type Final Discharge Note   Anticipated Discharge Disposition Home   Right Care Referral Info   Post Acute Recommendation No Care

## 2020-05-16 LAB
BACTERIA BLD CULT: NORMAL
BACTERIA BLD CULT: NORMAL

## 2020-05-18 LAB
FINAL PATHOLOGIC DIAGNOSIS: ABNORMAL
GROSS: ABNORMAL

## 2020-05-18 NOTE — PHYSICIAN QUERY
PT Name: Lorne Cartwright  MR #: [id number]     ACUITY OF CONDITION CLARIFICATION      CDS Renita Chavez RN, BSN        Contact Information:    [email or web address]         This form is a permanent document in the medical record.     Query Date: May 18, 2020    By submitting this query, we are merely seeking further clarification of documentation to reflect the severity of illness of your patient. Please utilize your independent clinical judgment when addressing the question(s) below.    The Medical record reflects the following:     Indicators   Supporting Clinical Findings Location in Medical Record   X   Documentation of condition 44 year old male with no past medical history who presented to the ED with reports of a 1 week history of epigastric abdominal pain, nausea, vomiting and black stools. he noted a small amount of bright red blood in his emesis. The patient denies a prior history of GI bleeding, but reports taking nadir seltzer as often as every 4 hours on a regular basis.   Acute blood loss anemia 5/12 H&P    X   Lab Value(s)    5/11   21:50 5/12 06:11 5/12   08:03 5/12   15:25 5/12  18:22 5/12   22:53 5/13   06:22 5/13   06:22 5/13   18:40 5/14   05:10 5/14   08:42   Hemoglobin 7.9 (L) 9.1 (L) 9.3 (L) 9.2 (L) 8.7 (L) 8.5 (L) 8.4 (L) 8.4 (L) 8.9 (L) 8.6 (L) 8.8 (L)   Hematocrit 24.1 (L) 27.9 (L) 28.3 (L) 28.0 (L) 26.7 (L) 25.5 (L) 25.2 (L) 25.2 (L) 27.0 (L) 26.0 (L) 26.4 (L)    Lab 5/11 --> 5/14     Radiology Findings     X   Treatment/Medication 2 units pRBC transfused         IV Protonix.  5/11 Blood Bank       5/12 H&P    X   Other Impression:             - Normal duodenal bulb and second portion of the uodenum.  - Oozing gastric ulcer with a visible vessel. Injected. Treated with bipolar cautery. Biopsied.  - Z-line regular.  - No gross lesions in esophagus. 5/12 EGD       Provider, please specify the acuity/chronicity of   gastric ulcer     [ x  ] Acute     [   ] Chronic     [   ] Acute  on chronic     [   ]  Clinically Undetermined         Please document in your progress notes daily for the duration of treatment until resolved, and include in your discharge summary.

## 2020-05-19 NOTE — PHYSICIAN QUERY
PT Name: Lorne Cartwright  MR #: [id number]    Pathology Findings Clarification     CDS Renita Chavez RN, BSN        Contact Information:    [email or web address]         This form is a permanent document in the medical record.     Query Date: May 19, 2020    By submitting this query, we are merely seeking further clarification of documentation.  Please utilize your independent clinical judgment when addressing the question(s) below.    The medical record contains the following:  Pathology Findings Location in Medical Record   44 year old male with no past medical history who presented to the ED with reports of a 1 week history of epigastric abdominal pain, nausea, vomiting and black stools. he noted a small amount of bright red blood in his emesis. The patient denies a prior history of GI bleeding, but reports taking nadir seltzer as often as every 4 hours on a regular basis.   Acute blood loss anemia      Impression:             - Normal duodenal bulb and second portion of the uodenum.  - Oozing gastric ulcer with a visible vessel. Injected. Treated with bipolar cautery. Biopsied.  - Z-line regular.  - No gross lesions in esophagus     Pt remained stable with stable vitals and stable H/H. He was given Iron Sucrose 200 mg daily x 2 dose. He had received one unit of P-RBC on the day of admit. Completed 72h IV protonix ggt  and switch to oral PPI today . GI has cleared pt for discharge to home today and follow up in the clinic in 2 weeks. He will continue Protonix 40 mg bid x 1 mos then one po daily thereafter and Carafate 1 gram po 4x daily x 1 week. Advised not to take Aleve , Ibuprofen or Aspirin product      SPECIMEN  1. Gastric biopsy, gastric ulcer. Rule out H Pylori.    FINAL PATHOLOGIC DIAGNOSIS    Stomach (biopsy):  - Helicobacter pylori gastritis with chronic active inflammation  - POSITIVE a Helicobacter organisms on H&E stain 5/12 H&P            5/12 EGD             Discharge  summary            Pathology report final result 5/18        Please clarify:    - Helicobacter pylori gastritis with chronic active inflammation    - POSITIVE a Helicobacter organisms on H&E stain      [ x ] Pathology findings noted above are ruled in/confirmed as diagnoses     [  ] Pathology findings noted above are not confirmed as diagnoses     [  ] Other diagnosis (please specify): ___________     [  ] Clinically Undetermined       Please document in your progress notes daily for the duration of treatment until resolved and include in your discharge summary.

## 2020-05-22 DIAGNOSIS — B96.81 HELICOBACTER PYLORI GASTRITIS: Primary | ICD-10-CM

## 2020-05-22 DIAGNOSIS — K29.70 HELICOBACTER PYLORI GASTRITIS: Primary | ICD-10-CM

## 2020-05-22 RX ORDER — OMEPRAZOLE 40 MG/1
40 CAPSULE, DELAYED RELEASE ORAL DAILY
Qty: 30 CAPSULE | Refills: 3 | Status: SHIPPED | OUTPATIENT
Start: 2020-05-22 | End: 2020-09-19

## 2020-05-22 RX ORDER — CLARITHROMYCIN 500 MG/1
500 TABLET, FILM COATED ORAL EVERY 12 HOURS
Qty: 28 TABLET | Refills: 0 | Status: SHIPPED | OUTPATIENT
Start: 2020-05-22 | End: 2020-06-05

## 2020-05-22 RX ORDER — AMOXICILLIN 500 MG/1
1000 CAPSULE ORAL EVERY 12 HOURS
Qty: 56 CAPSULE | Refills: 0 | Status: SHIPPED | OUTPATIENT
Start: 2020-05-22 | End: 2020-06-05

## 2020-05-22 NOTE — PROGRESS NOTES
Personally contacted this patient since he is Italian speaking. Advised him of results. Can someone call him back and given him an appt time in 2-3 weeks for hospital follow up. He informs me he was only given 7 days worth of PPI therapy. Therefore all 3 prescriptions sent to pharmacy info given to me today.

## 2021-01-14 ENCOUNTER — TELEPHONE (OUTPATIENT)
Dept: GASTROENTEROLOGY | Facility: CLINIC | Age: 46
End: 2021-01-14